# Patient Record
Sex: MALE | Race: WHITE | Employment: FULL TIME | URBAN - METROPOLITAN AREA
[De-identification: names, ages, dates, MRNs, and addresses within clinical notes are randomized per-mention and may not be internally consistent; named-entity substitution may affect disease eponyms.]

---

## 2018-11-02 ENCOUNTER — APPOINTMENT (OUTPATIENT)
Dept: PHYSICAL THERAPY | Age: 53
End: 2018-11-02

## 2018-11-12 ENCOUNTER — HOSPITAL ENCOUNTER (OUTPATIENT)
Dept: PHYSICAL THERAPY | Age: 53
Discharge: HOME | End: 2018-11-12
Payer: OTHER GOVERNMENT

## 2018-11-12 PROCEDURE — 97162 PT EVAL MOD COMPLEX 30 MIN: CPT

## 2018-11-12 PROCEDURE — 97110 THERAPEUTIC EXERCISES: CPT

## 2018-11-12 PROCEDURE — 97140 MANUAL THERAPY 1/> REGIONS: CPT

## 2018-11-12 NOTE — PROGRESS NOTES
[address] [facility] PHYSICAL THERAPY 
   [address], [address] [place] [address] Phone: [phone]  Fax: [phone] PLAN OF CARE / STATEMENT OF MEDICAL NECESSITY FOR PHYSICAL THERAPY SERVICES Patient Name: Bernard Bell : 1965 Medical  
Diagnosis: B Hip Pain Treatment Diagnosis: Right hip pain [M25.551] Onset Date: Chronic Referral Source: Ramon Samayoa MD Start of Care [facility]): 2018 Prior Hospitalization: See medical history Provider #: [id number] Prior Level of Function: Chronic history of difficulty with prolonged sitting and exercise activities. Comorbidities: HTN, Abdominal hernia repair with mesh  Medications: Verified on Patient Summary List  
The Plan of Care and following information is based on the information from the initial evaluation.  
=========================================================================================== Assessment / key information:  Patient is a 48year old male presenting to therapy with signs and symptoms consistent with B hip pain. Patient states he has been experiencing chronic R hip flexor tightness and pain since 2017. Patient received PT services for about 2 months which helped somewhat, however toward the end of his treatment began experiencing L hip (piriformis) pain. Patient states he stretches on a daily basis which helps somewhat as long as he doesn't over do it. Patient did receive an x-ray which was positive for BRANDON on the R side. Patient reports increased difficulty with prolonged sitting and exercising. Patient notes symptoms feel better with stretching and rest. Patient rates pain at worst 8/10 and 0/10 at best. 
 Objective Data: Inspection-Poor seated posture, forward head, rounded shoulders. Lumbar Spine AROM: flex hands to mid shins, ext 75%. B hip flexion AROM WNL, min restriction to B IR.  Strength Testing: Hip flex R 4/5, L 5/5; ext R 4/5, L 4/5; abd R 4/5, L 4/5; Knee Flex R 5/5, L 5/5; ext R 5/5, L 5/5. Flexibility Testing: moderate restriction to B quadriceps and HS. Tenderness to R IP, R RF, R VL, L PF, L glute max and glute med. FOTO: 71/100. Patient educated on diagnosis, prognosis, POC and HEP. Patient issued copy of HEP and denied additional questions. Patient will benefit from skilled PT in order to address these impairments and functional limitations. =========================================================================================== Eval Complexity: History HIGH Complexity :3+ comorbidities / personal factors will impact the outcome/ POC ;  Examination  HIGH Complexity : 4+ Standardized tests and measures addressing body structure, function, activity limitation and / or participation in recreation ; Presentation MEDIUM Complexity : Evolving with changing characteristics ; Decision Making MEDIUM Complexity : FOTO score of 26-74; Overall Complexity MEDIUM Problem List: pain affecting function, decrease ROM, decrease strength, impaired gait/ balance, decrease ADL/ functional abilitiies, decrease activity tolerance and decrease flexibility/ joint mobility Treatment Plan may include any combination of the following: Therapeutic exercise, Therapeutic activities, Neuromuscular re-education, Physical agent/modality, Gait/balance training, Manual therapy, Patient education and Functional mobility training Patient / Family readiness to learn indicated by: asking questions, trying to perform skills and interestPersons(s) to be included in education: patient (P) Barriers to Learning/Limitations: no 
Measures taken:   
Patient Goal (s): Reduce pain, resume fitness routine Patient self reported health status: good Rehabilitation Potential: good ? Short Term Goals: To be accomplished in  3  weeks: 1. Patient will demonstrate independence with HEP for self management of symptoms. 2. Patient will report reduction in pain at worst to 4-5/10 in order to improve tolerance to work activities. ? Long Term Goals: To be accomplished in  6  weeks: 1. Patient will improve FOTO to >/= 78/100 in order to improve quality of life. 2. Patient will report reduction in pain at worst to 1-2/10 in order to improve tolerance to recreational activities. 3. Patient will report a +5 on the GROC in order to prepare for DC to HEP. Frequency / Duration:   Patient to be seen  2  times per week for 6  weeks: 
Patient / Caregiver education and instruction: self care, activity modification and exercises G-Codes (GP): cecille Therapist Signature: Miguel Sandhu PT Date: 11/12/2018 Certification Period: na Time: 8:55 AM  
=========================================================================================== I certify that the above Physical Therapy Services are being furnished while the patient is under my care. I agree with the treatment plan and certify that this therapy is necessary. Physician Signature:       Date:      Time:  Please sign and return to [facility] or you may fax the signed copy to [phone]. Thank you.

## 2018-11-12 NOTE — PROGRESS NOTES
PHYSICAL THERAPY - DAILY TREATMENT NOTE Patient Name: Adelaide Travis        Date: 2018 : 1965   YES Patient  Verified Visit #:     Insurance: Payor: DALIA / Plan: Itzel Avitia / Product Type: Willie Imus / In time: 800 Out time: 845 Total Treatment Time: 45 Medicare Time Tracking (below) Total Timed Codes (min):  na 1:1 Treatment Time:  na  
TREATMENT AREA =  Right hip pain [M25.551] SUBJECTIVE Pain Level (on 0 to 10 scale):  3  / 10 Medication Changes/New allergies or changes in medical history, any new surgeries or procedures? NO    If yes, update Summary List  
Subjective Functional Status/Changes:  []  No changes reported See POC OBJECTIVE 10 min Therapeutic Exercise:  [x]  See flow sheet Rationale:      increase strength and increase proprioception to improve the patients ability to perform walking activities. 10 min Manual Therapy: A. R.T to R IP and L piriformis Rationale:      decrease pain, increase ROM, increase tissue extensibility and decrease trigger points to improve patient's ability to perform standing activities. min Patient Education:  YES  Reviewed HEP []  Progressed/Changed HEP based on: Other Objective/Functional Measures: 
 
See POC Post Treatment Pain Level (on 0 to 10) scale:   3  / 10 ASSESSMENT Assessment/Changes in Function:  
 
See POC []  See Progress Note/Recertification Patient will continue to benefit from skilled PT services to modify and progress therapeutic interventions, address functional mobility deficits, address ROM deficits, address strength deficits, analyze and address soft tissue restrictions, analyze and cue movement patterns, analyze and modify body mechanics/ergonomics and assess and modify postural abnormalities to attain remaining goals. Progress toward goals / Updated goals: 
See POC PLAN [x]  Upgrade activities as tolerated YES Continue plan of care []  Discharge due to :   
[]  Other:   
 
Therapist: García Quarles PT Date: 11/12/2018 Time: 9:08 AM  
 
Future Appointments Date Time Provider [place] 11/15/2018  2:00 PM Jane Bustamante, PT [facility]  
11/20/2018  3:30 PM Jane Bustamante, PT [facility]  
11/27/2018  3:00 PM Jane Bustamante, PT [facility]  
11/30/2018  8:00 AM Jane Bustamante, PT [facility]  
12/3/2018  7:30 AM Jane Bustamante, PT [facility]  
12/7/2018  7:30 AM Jane Bustamante, PT [facility]  
12/10/2018  7:30 AM Jane Bustamante, PT [facility]  
12/14/2018  7:30 AM Juany Light, PT [facility]

## 2018-11-15 ENCOUNTER — HOSPITAL ENCOUNTER (OUTPATIENT)
Dept: PHYSICAL THERAPY | Age: 53
Discharge: HOME | End: 2018-11-15
Payer: OTHER GOVERNMENT

## 2018-11-15 PROCEDURE — 97110 THERAPEUTIC EXERCISES: CPT

## 2018-11-15 PROCEDURE — 97140 MANUAL THERAPY 1/> REGIONS: CPT

## 2018-11-15 NOTE — PROGRESS NOTES
PHYSICAL THERAPY - DAILY TREATMENT NOTE Patient Name: Christy Mealing        Date: 11/15/2018 : 1965   YES Patient  Verified Visit #:      of   12  Insurance: Payor: DALIA / Plan: [address] / Product Type: [address] [place] [address] / In time: 206 Out time: 303 Total Treatment Time: 62 Medicare Time Tracking (below) Total Timed Codes (min):  na 1:1 Treatment Time:  na  
TREATMENT AREA =  Right hip pain [M25.551] SUBJECTIVE Pain Level (on 0 to 10 scale):  2  / 10 Medication Changes/New allergies or changes in medical history, any new surgeries or procedures? NO    If yes, update Summary List  
Subjective Functional Status/Changes:  []  No changes reported Patient reports compliance with his HEP and he feels he is doing a little bit better. OBJECTIVE Modalities Rationale:     decrease inflammation and decrease pain to improve patient's ability to perform transfers. min [] Estim, type/location:   
                                 []  att     []  unatt     []  w/US     []  w/ice    []  w/heat 
 min []  Mechanical Traction: type/lbs   
               []  pro   []  sup   []  int   []  cont    []  before manual    []  after manual  
 min []  Ultrasound, settings/location:    
 min []  Iontophoresis w/ dexamethasone, location:   
                                           []  take home patch       []  in clinic  
10 min [x]  Ice     []  Heat    location/position: To R hip flexor and L piriformis in supine with bolster  
 min []  Vasopneumatic Device, press/temp:   
 min []  Other:   
[x] Skin assessment post-treatment (if applicable):   
[x]  intact    []  redness- no adverse reaction    
[]redness  adverse reaction:     
27 min Therapeutic Exercise:  [x]  See flow sheet Rationale:      increase ROM and increase strength to improve the patients ability to perform walking activities. 20 min Manual Therapy: A. R.T to R IP, L piriformis Rationale:      decrease pain, increase ROM, increase tissue extensibility and decrease trigger points to improve patient's ability to perform recreational activities. min Patient Education:  YES  Reviewed HEP []  Progressed/Changed HEP based on: Other Objective/Functional Measures: 
 
Progressed therapy program per flowsheet in order to improve core and hip strength/stability Tenderness noted to R IP and L piriformis during MT Patient demonstrating good technique with HEP indicating good carry over from IE Post Treatment Pain Level (on 0 to 10) scale:   2  / 10 ASSESSMENT Assessment/Changes in Function:  
 
Patient denied changes in symptoms post session. Educated on likely increase in soreness over the next 24-48 hours, patient acknowledged understanding. []  See Progress Note/Recertification Patient will continue to benefit from skilled PT services to modify and progress therapeutic interventions, address functional mobility deficits, address ROM deficits, address strength deficits, analyze and address soft tissue restrictions, analyze and cue movement patterns, analyze and modify body mechanics/ergonomics and assess and modify postural abnormalities to attain remaining goals. Progress toward goals / Updated goals: 
Progressing with STG#1 PLAN [x]  Upgrade activities as tolerated YES Continue plan of care  
[]  Discharge due to :   
[]  Other:   
 
Therapist: Vangie Connell, PT Date: 11/15/2018 Time: 2:58 PM  
 
Future Appointments Date Time Provider [place] 11/20/2018  3:30 PM Fabrizio Zamarripa PT [facility]  
11/27/2018  3:00 PM Fabrizio Zamarripa PT [facility]  
11/30/2018  8:00 AM Fabrizio Zamarripa PT [facility]  
12/3/2018  7:30 AM Fabrizio Zamarripa PT [facility]  
12/7/2018  7:30 AM Fabrizio Zamarripa PT [facility]  
12/10/2018  7:30 AM Fabrizio Zamarripa PT [facility]  
12/14/2018  7:30 AM Caroline Adam, PT [facility]

## 2018-11-20 ENCOUNTER — HOSPITAL ENCOUNTER (OUTPATIENT)
Dept: PHYSICAL THERAPY | Age: 53
Discharge: HOME | End: 2018-11-20
Payer: OTHER GOVERNMENT

## 2018-11-20 PROCEDURE — 97110 THERAPEUTIC EXERCISES: CPT

## 2018-11-20 PROCEDURE — 97140 MANUAL THERAPY 1/> REGIONS: CPT

## 2018-11-27 ENCOUNTER — HOSPITAL ENCOUNTER (OUTPATIENT)
Dept: PHYSICAL THERAPY | Age: 53
Discharge: HOME | End: 2018-11-27
Payer: OTHER GOVERNMENT

## 2018-11-27 PROCEDURE — 97140 MANUAL THERAPY 1/> REGIONS: CPT

## 2018-11-27 PROCEDURE — 97110 THERAPEUTIC EXERCISES: CPT

## 2018-11-27 NOTE — PROGRESS NOTES
PHYSICAL THERAPY - DAILY TREATMENT NOTE Patient Name: Rupinder Vásquez        Date: 2018 : 1965   YES Patient  Verified Visit #:   4   of   12  Insurance: Payor: DALIA / Plan: Iram Lopes / Product Type: Dmitriyerin Jacey / In time: 258 Out time: 358 Total Treatment Time: 60 Medicare Time Tracking (below) Total Timed Codes (min):  na 1:1 Treatment Time:  na  
TREATMENT AREA =  Right hip pain [M25.551] SUBJECTIVE Pain Level (on 0 to 10 scale):  2  / 10 Medication Changes/New allergies or changes in medical history, any new surgeries or procedures? NO    If yes, update Summary List  
Subjective Functional Status/Changes:  []  No changes reported Patient reports his hips feel \"about the same\" over the past several days. Patient states he has been doing his exercises about 1 time per day and that his stretching still seems to help. OBJECTIVE Modalities Rationale:     decrease inflammation and decrease pain to improve patient's ability to perform transfers. min [] Estim, type/location:   
                                 []  att     []  unatt     []  w/US     []  w/ice    []  w/heat 
 min []  Mechanical Traction: type/lbs   
               []  pro   []  sup   []  int   []  cont    []  before manual    []  after manual  
 min []  Ultrasound, settings/location:    
 min []  Iontophoresis w/ dexamethasone, location:   
                                           []  take home patch       []  in clinic  
10 min [x]  Ice     []  Heat    location/position: To R hip in supine with bolster  
 min []  Vasopneumatic Device, press/temp:   
 min []  Other:   
[x] Skin assessment post-treatment (if applicable):   
[x]  intact    []  redness- no adverse reaction    
[]redness  adverse reaction:     
35 min Therapeutic Exercise:  [x]  See flow sheet Rationale:      increase ROM, increase strength, improve coordination and improve balance to improve the patients ability to perform recreational activities. 15 min Manual Therapy: A. R.T to R IP, R RF, L PF; STN to R adductors; R hip flexion and adduction stretch Rationale:      decrease pain, increase ROM, increase tissue extensibility and decrease trigger points to improve patient's ability to perform walking activities. min Patient Education:  YES  Reviewed HEP []  Progressed/Changed HEP based on: Other Objective/Functional Measures: 
 
Patient presenting with tenderness to R adductors and L PF during MT Added mini band hip IR/ER and monster walks in conjunction with side steps this session in order to continue to improve hip strength and stability Cued patient on pallof press on improving hold time in order to emphasize core stability Post Treatment Pain Level (on 0 to 10) scale:   1  / 10 ASSESSMENT Assessment/Changes in Function:  
 
Patient noted slight reduction in symptoms post session. Patient symptoms will likely begin to improve as he continues to perform consistent HEP and therapy program over the next several weeks. []  See Progress Note/Recertification Patient will continue to benefit from skilled PT services to modify and progress therapeutic interventions, address functional mobility deficits, address ROM deficits, address strength deficits, analyze and address soft tissue restrictions, analyze and cue movement patterns, analyze and modify body mechanics/ergonomics and assess and modify postural abnormalities to attain remaining goals. Progress toward goals / Updated goals: 
Progressing with LTG#2 PLAN [x]  Upgrade activities as tolerated YES Continue plan of care  
[]  Discharge due to :   
[]  Other:   
 
Therapist: García Quarles, PT Date: 11/27/2018 Time: 4:22 PM  
 
Future Appointments Date Time Provider [place] 11/30/2018  8:00 AM Juany Light, LUCY [facility] 12/3/2018  7:30 AM Rigoberto Cox, PT [facility]  
12/7/2018  7:30 AM Rigoberto Cox, PT [facility]  
12/10/2018  7:30 AM Rigoberto Cox, PT [facility]  
12/14/2018  7:30 AM Breana Ruiz, PT [facility]

## 2018-11-30 ENCOUNTER — HOSPITAL ENCOUNTER (OUTPATIENT)
Dept: PHYSICAL THERAPY | Age: 53
Discharge: HOME | End: 2018-11-30
Payer: OTHER GOVERNMENT

## 2018-11-30 PROCEDURE — 97110 THERAPEUTIC EXERCISES: CPT

## 2018-11-30 PROCEDURE — 97140 MANUAL THERAPY 1/> REGIONS: CPT

## 2018-11-30 NOTE — PROGRESS NOTES
PHYSICAL THERAPY - DAILY TREATMENT NOTE Patient Name: Juana Chakraborty        Date: 2018 : 1965   YES Patient  Verified Visit #:   5   of   12  Insurance: Payor:  / Plan: Cyndee Pastures / Product Type: Bud Suzanne / In time: 756 Out time: 123 Total Treatment Time: 76 Medicare Time Tracking (below) Total Timed Codes (min):  na 1:1 Treatment Time:  na  
TREATMENT AREA =  Right hip pain [M25.551] SUBJECTIVE Pain Level (on 0 to 10 scale):  1  / 10 Medication Changes/New allergies or changes in medical history, any new surgeries or procedures? NO    If yes, update Summary List  
Subjective Functional Status/Changes:  []  No changes reported Patient reports he feels like his hips are slowly improving. Patient states he is usually sore after his appointments but is able to [place] it out. \" OBJECTIVE Modalities Rationale:     decrease inflammation and decrease pain to improve patient's ability to perform transfers. min [] Estim, type/location:   
                                 []  att     []  unatt     []  w/US     []  w/ice    []  w/heat 
 min []  Mechanical Traction: type/lbs   
               []  pro   []  sup   []  int   []  cont    []  before manual    []  after manual  
 min []  Ultrasound, settings/location:    
 min []  Iontophoresis w/ dexamethasone, location:   
                                           []  take home patch       []  in clinic  
10 min [x]  Ice     []  Heat    location/position:   
 min []  Vasopneumatic Device, press/temp:   
 min []  Other:   
[] Skin assessment post-treatment (if applicable):   
[]  intact    []  redness- no adverse reaction    
[]redness  adverse reaction:     
43 min Therapeutic Exercise:  [x]  See flow sheet Rationale:      increase ROM, increase strength, improve coordination and increase proprioception to improve the patients ability to perform walking activities. 15 min Manual Therapy: STM to R RF, L glute med, L piriformis Rationale:      decrease pain, increase ROM, increase tissue extensibility and decrease trigger points to improve patient's ability to perform standing  
 min Patient Education:  YES  Reviewed HEP []  Progressed/Changed HEP based on: Other Objective/Functional Measures: Added SLR into adduction, abduction and ext this session and educated on performance at home Instructed on bottoms up HS stretch this session in order to improve posterior chain mobility Post Treatment Pain Level (on 0 to 10) scale:   0  / 10 ASSESSMENT Assessment/Changes in Function:  
 
Patient tolerated treatment session well, noted reduced pain post session. Patient educated on new exercises to perform at home, acknowledged understanding. []  See Progress Note/Recertification Patient will continue to benefit from skilled PT services to modify and progress therapeutic interventions, address functional mobility deficits, address ROM deficits, address strength deficits, analyze and address soft tissue restrictions, analyze and cue movement patterns, analyze and modify body mechanics/ergonomics and assess and modify postural abnormalities to attain remaining goals. Progress toward goals / Updated goals: 
Progressing with LTG#2 PLAN [x]  Upgrade activities as tolerated YES Continue plan of care  
[]  Discharge due to :   
[]  Other:   
 
Therapist: Sha Lynn PT Date: 11/30/2018 Time: 10:10 AM  
 
Future Appointments Date Time Provider [place] 12/3/2018  7:30 AM Juan C Brenner PT [address]  
12/7/2018  7:30 AM Juan C Brenner, PT [address]  
12/10/2018  7:30 AM Juan C Brenner PT [address]  
12/14/2018  7:30 AM Merwyn Jenny Wendelin Leventhal, PT [address]

## 2018-12-03 ENCOUNTER — HOSPITAL ENCOUNTER (OUTPATIENT)
Dept: PHYSICAL THERAPY | Age: 53
Discharge: HOME | End: 2018-12-03
Payer: OTHER GOVERNMENT

## 2018-12-03 PROCEDURE — 97761 PROSTHETIC TRAING 1ST ENC: CPT

## 2018-12-03 PROCEDURE — 97760 ORTHOTIC MGMT&TRAING 1ST ENC: CPT

## 2018-12-03 PROCEDURE — 97763 ORTHC/PROSTC MGMT SBSQ ENC: CPT

## 2018-12-03 PROCEDURE — 97140 MANUAL THERAPY 1/> REGIONS: CPT

## 2018-12-03 PROCEDURE — 97110 THERAPEUTIC EXERCISES: CPT

## 2018-12-03 NOTE — PROGRESS NOTES
PHYSICAL THERAPY - DAILY TREATMENT NOTE Patient Name: Sumi Burger        Date: 12/3/2018 : 1965   YES Patient  Verified Visit #:      12  Insurance: Payor: DALIA / Plan: Aracelis Burris / Product Type: Anderson Harpreet / In time: 725 Out time: 595 Total Treatment Time: 72 Medicare Time Tracking (below) Total Timed Codes (min):  na 1:1 Treatment Time:  na  
TREATMENT AREA =  Right hip pain [M25.551] SUBJECTIVE Pain Level (on 0 to 10 scale):  1  / 10 Medication Changes/New allergies or changes in medical history, any new surgeries or procedures? NO    If yes, update Summary List  
Subjective Functional Status/Changes:  []  No changes reported Patient reports he was feeling good over the weekend and feels his hips have been doing better overall. OBJECTIVE Modalities Rationale:     decrease inflammation and decrease pain to improve patient's ability to perform work activities. min [] Estim, type/location:   
                                 []  att     []  unatt     []  w/US     []  w/ice    []  w/heat 
 min []  Mechanical Traction: type/lbs   
               []  pro   []  sup   []  int   []  cont    []  before manual    []  after manual  
 min []  Ultrasound, settings/location:    
 min []  Iontophoresis w/ dexamethasone, location:   
                                           []  take home patch       []  in clinic  
10 min [x]  Ice     []  Heat    location/position: To B hips in supine with bolster  
 min []  Vasopneumatic Device, press/temp:   
 min []  Other:   
[x] Skin assessment post-treatment (if applicable):   
[x]  intact    []  redness- no adverse reaction    
[]redness  adverse reaction:     40 min Therapeutic Exercise:  [x]  See flow sheet Rationale:      increase ROM and increase strength to improve the patients ability to perform recreational activities. 15 min Manual Therapy: STM to R RF, R IP, R adductors, L PF , L glute med Rationale:      decrease pain, increase ROM, increase tissue extensibility and decrease trigger points to improve patient's ability to perform household activities. min Patient Education:  YES  Reviewed HEP []  Progressed/Changed HEP based on: Other Objective/Functional Measures: Added goblet squats with 15# weight this session in order to improve core and LE strength Continued tenderness to R adductors during MT 
FOTO 76/100 Post Treatment Pain Level (on 0 to 10) scale:   0  / 10 ASSESSMENT Assessment/Changes in Function:  
 
Patient tolerated treatment session well, noted reduced pain post session. Patient progressing well with PT program at this time. []  See Progress Note/Recertification Patient will continue to benefit from skilled PT services to modify and progress therapeutic interventions, address functional mobility deficits, address ROM deficits, address strength deficits, analyze and address soft tissue restrictions, analyze and cue movement patterns, analyze and modify body mechanics/ergonomics and assess and modify postural abnormalities to attain remaining goals. Progress toward goals / Updated goals: 
Progressing well toward LTG#1 PLAN [x]  Upgrade activities as tolerated YES Continue plan of care  
[]  Discharge due to :   
[]  Other:   
 
Therapist: Delia Gonzalez, PT Date: 12/3/2018 Time: 9:38 AM  
 
Future Appointments Date Time Provider [place] 12/7/2018  7:30 AM Kasi Storey, PT [facility]  
12/10/2018  7:30 AM Kasi Storey, PT [facility]  
12/18/2018  3:30 PM Kasi Storey, PT [facility]

## 2018-12-07 ENCOUNTER — HOSPITAL ENCOUNTER (OUTPATIENT)
Dept: PHYSICAL THERAPY | Age: 53
Discharge: HOME | End: 2018-12-07
Payer: OTHER GOVERNMENT

## 2018-12-07 PROCEDURE — 97140 MANUAL THERAPY 1/> REGIONS: CPT

## 2018-12-07 PROCEDURE — 97110 THERAPEUTIC EXERCISES: CPT

## 2018-12-07 NOTE — PROGRESS NOTES
[address]  [facility] THERAPY 
[address] [place], [place] [address],[facility], [address] [facility] - Phone: [phone]  Fax: [phone] PROGRESS NOTE Patient Name: Ricardo Fam : 1965 Treatment/Medical Diagnosis: Right hip pain [M25.551] Referral Source: Queen Merissa MD    
Date of Initial Visit: 18 Attended Visits: 7 Missed Visits: 0  
SUMMARY OF TREATMENT Patient has attended 6 follow up visits since his initial evaluation on 18. Patient has received therapeutic exercise, manual therapy and modalities in order to improve B hip ROM, mobility, flexibility, strength, stability, soft tissue extensibility and pain reduction. CURRENT STATUS Patient reports a 50% improvement in symptoms since the start of therapy. Patient states he feels like his strength is improving and has noticed reduced symptoms overall since starting his exercise program. Patient has remained compliant with his home exercises and feels this combined with stretching has helped keep his symptoms reduced. Patient notes his pain at worst is a 3/10 and on average 1/10. Patient notes an improved FOTO score to 76/100 and a +4/100 indicating he is moderately better. Goal/Measure of Progress Goal Met? 1. Patient will improve FOTO to >/= 78/100 in order to improve quality of life Status at last Eval: 71/100 Current Status: 76/100 progressing 2. Patient will report reduction in pain at worst to 1-2/10 in order to improve tolerance to recreational activities Status at last Eval: 8/10 Current Status: 3/10 progressing 3. Patient will report a +5 on the [facility] in order to prepare for DC to HEP Status at last Eval: n/a Current Status: +4 progressing New Goals to be achieved in __3__  weeks: 1. Continue with LTG#1 2. Continue with LTG#2 3. Continue with LTG#3 RECOMMENDATIONS Plan to continue with PT 1x/week for 3 weeks with emphasis on promoting independence with HEP. Plan to DC to HEP at the end of this time. If you have any questions/comments please contact us directly at [phone]. Thank you for allowing us to assist in the care of your patient. Therapist Signature: Dexter Ramirez PT Date: 12/7/2018 Time: 7:57 AM  
NOTE TO PHYSICIAN:  PLEASE COMPLETE THE ORDERS BELOW AND FAX TO [facility] Physical Therapy: [phone] If you are unable to process this request in 24 hours please contact our office: [phone] 
 
___ I have read the above report and request that my patient continue as recommended.  
___ I have read the above report and request that my patient continue therapy with the following changes/special instructions:_________________________________________________________  
___ I have read the above report and request that my patient be discharged from therapy.   
 
Physician Signature:       Date:      Time:

## 2018-12-07 NOTE — PROGRESS NOTES
PHYSICAL THERAPY - DAILY TREATMENT NOTE Patient Name: Ignacio Caicedo        Date: 2018 : 1965   YES Patient  Verified Visit #:     Insurance: Payor:  / Plan: Radha Cornea / Product Type: Jose Pond / In time: 720 Out time: 820 Total Treatment Time: 60 Medicare Time Tracking (below) Total Timed Codes (min):  na 1:1 Treatment Time:  na  
TREATMENT AREA =  Right hip pain [M25.551] SUBJECTIVE Pain Level (on 0 to 10 scale):  2  / 10 Medication Changes/New allergies or changes in medical history, any new surgeries or procedures? NO    If yes, update Summary List  
Subjective Functional Status/Changes:  []  No changes reported Patient reports a 50% improvement in symptoms since the start of therapy. Patient states his core exercise program seems to be helping his strength and overall notices reduced pain levels. OBJECTIVE Modalities Rationale:     decrease inflammation and decrease pain to improve patient's ability to perform work activities. min [] Estim, type/location:   
                                 []  att     []  unatt     []  w/US     []  w/ice    []  w/heat 
 min []  Mechanical Traction: type/lbs   
               []  pro   []  sup   []  int   []  cont    []  before manual    []  after manual  
 min []  Ultrasound, settings/location:    
 min []  Iontophoresis w/ dexamethasone, location:   
                                           []  take home patch       []  in clinic  
10 min [x]  Ice     []  Heat    location/position: To B hips in supine with bolster  
 min []  Vasopneumatic Device, press/temp:   
 min []  Other:   
[x] Skin assessment post-treatment (if applicable):   
[x]  intact    []  redness- no adverse reaction    
[]redness  adverse reaction:     
35 min Therapeutic Exercise:  [x]  See flow sheet Rationale:      increase ROM and increase strength to improve the patients ability to perform walking activities. 15 min Manual Therapy: STM to R RF, R adductors, L glute med, L piriformis, L TFL Rationale:      decrease pain, increase ROM, increase tissue extensibility and decrease trigger points to improve patient's ability to perform standing activities. min Patient Education:  YES  Reviewed HEP []  Progressed/Changed HEP based on: Other Objective/Functional Measures: 
 
See PN Post Treatment Pain Level (on 0 to 10) scale:   0  / 10 ASSESSMENT Assessment/Changes in Function:  
 
See PN 
  
[]  See Progress Note/Recertification Patient will continue to benefit from skilled PT services to modify and progress therapeutic interventions, address functional mobility deficits, address ROM deficits, address strength deficits, analyze and address soft tissue restrictions, analyze and cue movement patterns, analyze and modify body mechanics/ergonomics, assess and modify postural abnormalities and address imbalance/dizziness to attain remaining goals. Progress toward goals / Updated goals: 
See PN  
 
PLAN [x]  Upgrade activities as tolerated YES Continue plan of care  
[]  Discharge due to :   
[]  Other:   
 
Therapist: Faina Paz PT Date: 12/7/2018 Time: 9:35 AM  
 
Future Appointments Date Time Provider [place] 12/14/2018  7:00 AM Jesus Lucas, PT [facility]  
12/18/2018  3:30 PM Jesus Lucas PT [facility]

## 2018-12-10 ENCOUNTER — APPOINTMENT (OUTPATIENT)
Dept: PHYSICAL THERAPY | Age: 53
End: 2018-12-10
Payer: OTHER GOVERNMENT

## 2018-12-14 ENCOUNTER — HOSPITAL ENCOUNTER (OUTPATIENT)
Dept: PHYSICAL THERAPY | Age: 53
Discharge: HOME | End: 2018-12-14
Payer: OTHER GOVERNMENT

## 2018-12-14 ENCOUNTER — APPOINTMENT (OUTPATIENT)
Dept: PHYSICAL THERAPY | Age: 53
End: 2018-12-14
Payer: OTHER GOVERNMENT

## 2018-12-14 PROCEDURE — 97110 THERAPEUTIC EXERCISES: CPT

## 2018-12-14 PROCEDURE — 97140 MANUAL THERAPY 1/> REGIONS: CPT

## 2018-12-14 NOTE — PROGRESS NOTES
PHYSICAL THERAPY - DAILY TREATMENT NOTE    Patient Name: Cindi Hargrove        Date: 2018  : 1965   YES Patient  Verified  Visit #:      12  Insurance: Payor: DALIA / Plan: Joanne Holly / Product Type:  /      In time: 658 Out time: 754   Total Treatment Time: 56     Medicare Time Tracking (below)   Total Timed Codes (min):  na 1:1 Treatment Time:  na     TREATMENT AREA =  Right hip pain [M25.551]    SUBJECTIVE  Pain Level (on 0 to 10 scale):  .5  / 10   Medication Changes/New allergies or changes in medical history, any new surgeries or procedures? NO    If yes, update Summary List   Subjective Functional Status/Changes:  []  No changes reported     Patient reports both hips have been feeling good since his LV. Patient continues to perform his exercises which seems to be helping improve his strength and mobility. OBJECTIVE  Modalities Rationale:     decrease pain and increase tissue extensibility to improve patient's ability to perform work activities.     min [] Estim, type/location:                                      []  att     []  unatt     []  w/US     []  w/ice    []  w/heat    min []  Mechanical Traction: type/lbs                   []  pro   []  sup   []  int   []  cont    []  before manual    []  after manual    min []  Ultrasound, settings/location:      min []  Iontophoresis w/ dexamethasone, location:                                               []  take home patch       []  in clinic   10 min []  Ice     [x]  Heat    location/position: To R adductors and L hip in supine with bolster    min []  Vasopneumatic Device, press/temp:     min []  Other:    [x] Skin assessment post-treatment (if applicable):    [x]  intact    []  redness- no adverse reaction     []redness  adverse reaction:        30 min Therapeutic Exercise:  [x]  See flow sheet   Rationale:      increase ROM and increase strength to improve the patients ability to perform walking activities. 14 min Manual Therapy: STM to R adductors, R IP; STM to L piriformis, L glute med, L TFL   Rationale:      decrease pain, increase ROM, increase tissue extensibility and decrease trigger points to improve patient's ability to perform standing activities. min Patient Education:  YES  Reviewed HEP   []  Progressed/Changed HEP based on: Other Objective/Functional Measures:    Continued with modified exercise program this session in order to avoid exacerbating symptoms  Continued tenderness to R adductors and L lateral hip musculature, however improving from previous sessions     Post Treatment Pain Level (on 0 to 10) scale:   .5  / 10     ASSESSMENT  Assessment/Changes in Function:     Patient denied changes in symptoms post session. Patient will be off from PT for 1 week in order to assess tolerance to independent performance of exercise program. If no symptoms arise, plan to DC to HEP at [place].      []  See Progress Note/Recertification   Patient will continue to benefit from skilled PT services to modify and progress therapeutic interventions, address functional mobility deficits, address ROM deficits, address strength deficits, analyze and address soft tissue restrictions, analyze and cue movement patterns, analyze and modify body mechanics/ergonomics, assess and modify postural abnormalities and address imbalance/dizziness to attain remaining goals.    Progress toward goals / Updated goals:    Progressing with reduced pain levels and independence with program      PLAN  [x]  Upgrade activities as tolerated YES Continue plan of care   []  Discharge due to :    []  Other:      Therapist: Vilma Baron, PT    Date: 12/14/2018 Time: 8:03 AM     Future Appointments   Date Time Provider [place]   12/21/2018 10:30 AM Soila Blake, PT [facility]

## 2018-12-18 ENCOUNTER — APPOINTMENT (OUTPATIENT)
Dept: PHYSICAL THERAPY | Age: 53
End: 2018-12-18
Payer: OTHER GOVERNMENT

## 2018-12-21 ENCOUNTER — HOSPITAL ENCOUNTER (OUTPATIENT)
Dept: PHYSICAL THERAPY | Age: 53
Discharge: HOME | End: 2018-12-21
Payer: OTHER GOVERNMENT

## 2018-12-21 PROCEDURE — 97140 MANUAL THERAPY 1/> REGIONS: CPT

## 2018-12-21 PROCEDURE — 97110 THERAPEUTIC EXERCISES: CPT

## 2018-12-21 NOTE — PROGRESS NOTES
PHYSICAL THERAPY - DAILY TREATMENT NOTE    Patient Name: Johnson Genao        Date: 2018  : 1965   YES Patient  Verified  Visit #:     Insurance: Payor: DALIA / Plan: Josselin Vallecillo / Product Type:  /      In time: 398 Out time: 1450   Total Treatment Time: 54     Medicare Time Tracking (below)   Total Timed Codes (min):  na 1:1 Treatment Time:  na     TREATMENT AREA =  Right hip pain [M25.551]    SUBJECTIVE  Pain Level (on 0 to 10 scale):  1  / 10   Medication Changes/New allergies or changes in medical history, any new surgeries or procedures? NO    If yes, update Summary List   Subjective Functional Status/Changes:  []  No changes reported     Patient reports he has been foam rolling his R groin and the back of his L hip and that seems to help keep his pain levels down. Patient states he would like to start to incorporate squats back into his program.          OBJECTIVE  Modalities Rationale:     decrease pain and increase tissue extensibility to improve patient's ability to perform work activities.     min [] Estim, type/location:                                      []  att     []  unatt     []  w/US     []  w/ice    []  w/heat    min []  Mechanical Traction: type/lbs                   []  pro   []  sup   []  int   []  cont    []  before manual    []  after manual    min []  Ultrasound, settings/location:      min []  Iontophoresis w/ dexamethasone, location:                                               []  take home patch       []  in clinic   10 min []  Ice     [x]  Heat    location/position: To R adductors and L posterior hip in supine with bolster    min []  Vasopneumatic Device, press/temp:     min []  Other:    [] Skin assessment post-treatment (if applicable):    [x]  intact    []  redness- no adverse reaction     []redness  adverse reaction:        30 min Therapeutic Exercise:  [x]  See flow sheet   Rationale:      increase ROM and increase strength to improve the patients ability to perform walking activities. 14 min Manual Therapy: STM to R adductors, R IP, L PF, L TFL; R hip flexion and ER stretch   Rationale:      decrease pain, increase ROM, increase tissue extensibility and decrease trigger points to improve patient's ability to perform recreational activities. min Patient Education:  YES  Reviewed HEP   []  Progressed/Changed HEP based on: Other Objective/Functional Measures: Added goblet squat with 15# weight this session in order to improve core and B hip strength/stability  Minimal tenderness noted to R adductors during MT, an improvement compared to previous visits     Post Treatment Pain Level (on 0 to 10) scale:   0  / 10     ASSESSMENT  Assessment/Changes in Function:     Patient tolerated session well, denied increased pain post session. Patient appropriate to transition to DC to HEP at [place] if no symptom arise over this time. []  See Progress Note/Recertification   Patient will continue to benefit from skilled PT services to modify and progress therapeutic interventions, address functional mobility deficits, address ROM deficits, address strength deficits, analyze and address soft tissue restrictions, analyze and cue movement patterns, analyze and modify body mechanics/ergonomics and assess and modify postural abnormalities to attain remaining goals. Progress toward goals / Updated goals:    Plan to DC to HEP at [place] if no symptoms arise.       PLAN  [x]  Upgrade activities as tolerated YES Continue plan of care   []  Discharge due to :    []  Other:      Therapist: Sky Simmons PT    Date: 12/21/2018 Time: 11:51 AM     Future Appointments   Date Time Provider [place]   1/7/2019  7:00 AM Joyce Mason, LUC[facility]

## 2019-01-04 ENCOUNTER — HOSPITAL ENCOUNTER (OUTPATIENT)
Dept: PHYSICAL THERAPY | Age: 54
Discharge: HOME | End: 2019-01-04
Payer: OTHER GOVERNMENT

## 2019-01-04 PROCEDURE — 97140 MANUAL THERAPY 1/> REGIONS: CPT

## 2019-01-04 PROCEDURE — 97110 THERAPEUTIC EXERCISES: CPT

## 2019-01-04 NOTE — PROGRESS NOTES
PHYSICAL THERAPY - DAILY TREATMENT NOTE Patient Name: Fabrizio Rivera        Date: 2019 : 1965   YES Patient  Verified Visit #:   42   of   12(+4)  Insurance: Payor:  / Plan: Dock Prows / Product Type: Carter Hover / In time: 1000 Out time: 1103 Total Treatment Time: 61 Medicare Time Tracking (below) Total Timed Codes (min):  na 1:1 Treatment Time:  na  
TREATMENT AREA =  Right hip pain [M25.551] SUBJECTIVE Pain Level (on 0 to 10 scale):  2  / 10 Medication Changes/New allergies or changes in medical history, any new surgeries or procedures? NO    If yes, update Summary List  
Subjective Functional Status/Changes:  []  No changes reported Patient reports a 50% improvement in symptoms since the start of PT. Patient states he has remained consistent with his HEP but feels like his symptoms still persist. Patient rates his pain at worst as a 3-4/10. OBJECTIVE Modalities Rationale:     decrease pain and increase tissue extensibility to improve patient's ability to perform transfers. min [] Estim, type/location:   
                                 []  att     []  unatt     []  w/US     []  w/ice    []  w/heat 
 min []  Mechanical Traction: type/lbs   
               []  pro   []  sup   []  int   []  cont    []  before manual    []  after manual  
 min []  Ultrasound, settings/location:    
 min []  Iontophoresis w/ dexamethasone, location:   
                                           []  take home patch       []  in clinic  
10 min []  Ice     [x]  Heat    location/position: To B hips in supine with bolster  
 min []  Vasopneumatic Device, press/temp:   
 min []  Other:   
[x] Skin assessment post-treatment (if applicable):   
[x]  intact    []  redness- no adverse reaction    
[]redness  adverse reaction:     
40 min Therapeutic Exercise:  [x]  See flow sheet Rationale:      increase ROM and increase strength to improve the patients ability to perform recreational activities. 13 min Manual Therapy: STM to R HS, R adductors, L PF, L glute max Rationale:      decrease pain, increase ROM, increase tissue extensibility and decrease trigger points to improve patient's ability to perform standing activities. min Patient Education:  YES  Reviewed HEP []  Progressed/Changed HEP based on: Other Objective/Functional Measures: 
 
See PN Post Treatment Pain Level (on 0 to 10) scale:   0  / 10 ASSESSMENT Assessment/Changes in Function:  
 
See PN 
  
 
PLAN [x]  Upgrade activities as tolerated YES Continue plan of care  
[]  Discharge due to :   
[]  Other:   
 
Therapist: Bennett Tay PT Date: 1/4/2019 Time: 11:54 AM  
 
Future Appointments Date Time Provider [place] 1/8/2019  9:00 AM Davian Dimas, PT [facility]  
1/15/2019  9:00 AM Davian Dimas, PT [facility]  
1/22/2019  9:00 AM Davian Dimas, PT [facility]  
1/28/2019  7:30 AM Davian Dimas, PT [facility]  
2/4/2019  7:30 AM Mango Hill, PT [facility]

## 2019-01-04 NOTE — PROGRESS NOTES
[address]  [facility] THERAPY 
[address] [facility], [address] [facility] - Phone: [phone]  Fax: [phone] PROGRESS NOTE Patient Name: Kim Bañuelos : 1965 Treatment/Medical Diagnosis: Right hip pain [M25.551] Referral Source: Chana Burleson MD    
Date of Initial Visit: 18 Attended Visits: 10 Missed Visits: 0  
SUMMARY OF TREATMENT Patient has attended 9 follow up visits since his initial evaluation on 18. Patient has received therapeutic exercise, manual therapy and modalities in order to improve R hip ROM, mobility, strength, stability, soft tissue extensibility and pain reduction. CURRENT STATUS Patient reports a continued 50% improvement in symptoms since the start of PT. Patient has attended 2 follow up appointment since his last progress note on 18 as an assessment with less PT and increased performance of HEP was taken. Patient states overall he did well over this time, but still notices symptoms into both hip. Patient does demonstrate improved B hip strength as evident by performance of current exercise program. Patient would continue to benefit from PT in order to progress current program and help reduce pain with work and household activities. Goal/Measure of Progress Goal Met? 1. Patient will improve FOTO to >/= 78/100 in order to improve quality of life Status at last Eval: 76/100 Current Status: n/a n/a 2. Patient will report reduction in pain at worst to 1-2/10 in order to improve tolerance to recreational activities Status at last Eval: 310 Current Status: 3-4/10 no 3. Patient will report a +5 on the [facility] in order to prepare for DC to HEP Status at last Eval: +4 Current Status: n/a n/a New Goals to be achieved in __4__  weeks: 1. Continue with LTG#1 2. Continue with LTG#2 3. Continue with LTG#3 RECOMMENDATIONS Patient would benefit from continued PT 1x/week for 4 weeks.  Plan to DC to HEP at the end of this time. If you have any questions/comments please contact us directly at [phone]. Thank you for allowing us to assist in the care of your patient. Therapist Signature: Karen Koenig PT Date: 1/4/2019 Time: 11:02 AM  
NOTE TO PHYSICIAN:  PLEASE COMPLETE THE ORDERS BELOW AND FAX TO [facility] Physical Therapy: [phone] If you are unable to process this request in 24 hours please contact our office: [phone] 
 
___ I have read the above report and request that my patient continue as recommended.  
___ I have read the above report and request that my patient continue therapy with the following changes/special instructions:_________________________________________________________  
___ I have read the above report and request that my patient be discharged from therapy.   
 
Physician Signature:       Date:      Time:

## 2019-01-07 ENCOUNTER — APPOINTMENT (OUTPATIENT)
Dept: PHYSICAL THERAPY | Age: 54
End: 2019-01-07
Payer: OTHER GOVERNMENT

## 2019-01-08 ENCOUNTER — HOSPITAL ENCOUNTER (OUTPATIENT)
Dept: PHYSICAL THERAPY | Age: 54
Discharge: HOME | End: 2019-01-08
Payer: OTHER GOVERNMENT

## 2019-01-08 PROCEDURE — 97110 THERAPEUTIC EXERCISES: CPT

## 2019-01-08 PROCEDURE — 97140 MANUAL THERAPY 1/> REGIONS: CPT

## 2019-01-08 NOTE — PROGRESS NOTES
PHYSICAL THERAPY - DAILY TREATMENT NOTE Patient Name: Rajesh Rodriguez        Date: 2019 : 1965   YES Patient  Verified Visit #:   6   of   16  Insurance: Payor: DALIA / Plan: Mikel Diego / Product Type: [place] Leslie / In time: 904 Out time: 2569 Total Treatment Time: 61 Medicare Time Tracking (below) Total Timed Codes (min):  na 1:1 Treatment Time:  na  
TREATMENT AREA =  Right hip pain [M25.551] SUBJECTIVE Pain Level (on 0 to 10 scale):  2  / 10 Medication Changes/New allergies or changes in medical history, any new surgeries or procedures? NO    If yes, update Summary List  
Subjective Functional Status/Changes:  []  No changes reported Patient reports he felt good after his last appointment and tried his new exercises at home. Patient states he went rollerbladding over the weekend which made him more sore. OBJECTIVE Modalities Rationale:     decrease pain and increase tissue extensibility to improve patient's ability to perform transfers. min [] Estim, type/location:   
                                 []  att     []  unatt     []  w/US     []  w/ice    []  w/heat 
 min []  Mechanical Traction: type/lbs   
               []  pro   []  sup   []  int   []  cont    []  before manual    []  after manual  
 min []  Ultrasound, settings/location:    
 min []  Iontophoresis w/ dexamethasone, location:   
                                           []  take home patch       []  in clinic  
10 min []  Ice     [x]  Heat    location/position: To B hips in supine with bolster  
 min []  Vasopneumatic Device, press/temp:   
 min []  Other:   
[x] Skin assessment post-treatment (if applicable):   
[x]  intact    []  redness- no adverse reaction    
[]redness  adverse reaction:     
40 min Therapeutic Exercise:  [x]  See flow sheet Rationale:      increase ROM and increase strength to improve the patients ability to perform recreational activities. 13 min Manual Therapy: STM to L glute max, L proximal HS, L TFL Rationale:      decrease pain, increase ROM, increase tissue extensibility and decrease trigger points to improve patient's ability to perform walking activities. min Patient Education:  YES  Reviewed HEP []  Progressed/Changed HEP based on: Other Objective/Functional Measures: 
 
Patient demonstrating improved tolerance to hip IR mobilization this session indicating good carry over from his previous session Added YTB to dead bug this session in order to improve anterior core stability. Post Treatment Pain Level (on 0 to 10) scale:   0  / 10 ASSESSMENT Assessment/Changes in Function:  
 
Patient tolerated treatment session well. Patient would benefit from continued progression of program in order to improve tolerance to recreational activities. []  See Progress Note/Recertification Patient will continue to benefit from skilled PT services to modify and progress therapeutic interventions, address functional mobility deficits, address ROM deficits, address strength deficits, analyze and address soft tissue restrictions, analyze and cue movement patterns, analyze and modify body mechanics/ergonomics and assess and modify postural abnormalities to attain remaining goals. Progress toward goals / Updated goals: 
Progressing with long term strength PLAN [x]  Upgrade activities as tolerated YES Continue plan of care  
[]  Discharge due to :   
[]  Other:   
 
Therapist: Ariel Olvera PT Date: 1/8/2019 Time: 11:04 AM  
 
Future Appointments Date Time Provider [place] 1/15/2019  9:00 AM Franklin Galindo PT [facility]  
1/22/2019  9:00 AM Franklin Galindo PT [facility]  
1/28/2019  7:30 AM Franklin Galindo PT [facility]  
2/4/2019  7:30 AM Marcial Glaser PT [facility]

## 2019-01-15 ENCOUNTER — HOSPITAL ENCOUNTER (OUTPATIENT)
Dept: PHYSICAL THERAPY | Age: 54
Discharge: HOME | End: 2019-01-15
Payer: OTHER GOVERNMENT

## 2019-01-15 PROCEDURE — 97140 MANUAL THERAPY 1/> REGIONS: CPT

## 2019-01-15 PROCEDURE — 97110 THERAPEUTIC EXERCISES: CPT

## 2019-01-15 NOTE — PROGRESS NOTES
PHYSICAL THERAPY - DAILY TREATMENT NOTE Patient Name: Vandana Curiel        Date: 1/15/2019 : 1965   YES Patient  Verified Visit #:   15   of   16  Insurance: Payor:  / Plan: Dorothy Barlow / Product Type: Ramakrishna Arianna / In time: 851 Out time: 297 Total Treatment Time: 72 Medicare Time Tracking (below) Total Timed Codes (min):  na 1:1 Treatment Time:  na  
TREATMENT AREA =  Right hip pain [M25.551] SUBJECTIVE Pain Level (on 0 to 10 scale):  0  / 10 Medication Changes/New allergies or changes in medical history, any new surgeries or procedures? NO    If yes, update Summary List  
Subjective Functional Status/Changes:  []  No changes reported Patient reports he took a trip to [place] over the weekend and didn't experience any pain. Patient was unable to do his exercises but didn't notice residual symptoms because of this. OBJECTIVE Modalities Rationale:     decrease pain and increase tissue extensibility to improve patient's ability to perform transfers. min [] Estim, type/location:   
                                 []  att     []  unatt     []  w/US     []  w/ice    []  w/heat 
 min []  Mechanical Traction: type/lbs   
               []  pro   []  sup   []  int   []  cont    []  before manual    []  after manual  
 min []  Ultrasound, settings/location:    
 min []  Iontophoresis w/ dexamethasone, location:   
                                           []  take home patch       []  in clinic  
10 min []  Ice     [x]  Heat    location/position: To B hips in supine with bolster  
 min []  Vasopneumatic Device, press/temp:   
 min []  Other:   
[x] Skin assessment post-treatment (if applicable):   
[x]  intact    []  redness- no adverse reaction    
[]redness  adverse reaction:     
40 min Therapeutic Exercise:  [x]  See flow sheet Rationale:      increase ROM and increase strength to improve the patients ability to perform walking activities. 15 min Manual Therapy: STM to L glute med, L piriformis; L hip IR PROM Rationale:      decrease pain, increase ROM, increase tissue extensibility and decrease trigger points to improve patient's ability to perform work activities. min Patient Education:  YES  Reviewed HEP []  Progressed/Changed HEP based on: Other Objective/Functional Measures: 
 
Patient cued on goblet squat to maintain hip hinge mechanics and avoid anterior translation Continues to be limited with L hip IR PROM at onset of treatment, improves as treatment progresses Post Treatment Pain Level (on 0 to 10) scale:   0  / 10 ASSESSMENT Assessment/Changes in Function:  
 
Patient tolerated treatment session well. Patient demonstrating good knowledge of therapy program indicating good carry over between sessions. []  See Progress Note/Recertification Patient will continue to benefit from skilled PT services to modify and progress therapeutic interventions, address functional mobility deficits, address ROM deficits, address strength deficits, analyze and address soft tissue restrictions, analyze and cue movement patterns, analyze and modify body mechanics/ergonomics, assess and modify postural abnormalities and address imbalance/dizziness to attain remaining goals. Progress toward goals / Updated goals: 
Progressing with long term pain goals PLAN [x]  Upgrade activities as tolerated YES Continue plan of care  
[]  Discharge due to :   
[]  Other:   
 
Therapist: Jose Antonio Boston PT Date: 1/15/2019 Time: 9:59 AM  
 
Future Appointments Date Time Provider [place] 1/22/2019  9:00 AM Armond Christina, PT [facility]  
1/28/2019  7:30 AM Armond Christina, PT [facility]  
2/4/2019  7:30 AM Black Ball, PT [facility]

## 2019-01-22 ENCOUNTER — APPOINTMENT (OUTPATIENT)
Dept: PHYSICAL THERAPY | Age: 54
End: 2019-01-22
Payer: OTHER GOVERNMENT

## 2019-01-24 ENCOUNTER — HOSPITAL ENCOUNTER (OUTPATIENT)
Dept: PHYSICAL THERAPY | Age: 54
Discharge: HOME | End: 2019-01-24
Payer: OTHER GOVERNMENT

## 2019-01-24 PROCEDURE — 97140 MANUAL THERAPY 1/> REGIONS: CPT

## 2019-01-24 PROCEDURE — 97110 THERAPEUTIC EXERCISES: CPT

## 2019-01-24 NOTE — PROGRESS NOTES
PHYSICAL THERAPY - DAILY TREATMENT NOTE Patient Name: Louis Low        Date: 2019 : 1965   yes Patient  Verified Visit #:   15   of   16  Insurance: Payor: DALIA / Plan: Geovany Mahoney / Product Type: Allegra Cait / In time: 1026 Out time: 1135 Total Treatment Time: 71 Medicare/BCBS Time Tracking (below) Total Timed Codes (min):  na 1:1 Treatment Time:  na  
TREATMENT AREA =  Right hip pain [M25.551] SUBJECTIVE Pain Level (on 0 to 10 scale):  2  / 10 Medication Changes/New allergies or changes in medical history, any new surgeries or procedures?    no  If yes, update Summary List  
Subjective Functional Status/Changes:  []  No changes reported Patient reports he has been doing his exercise program at home and notices he may be over doing it because he gets sore. Patient feels like initiating yoga has helped. OBJECTIVE Modalities Rationale:     decrease pain and increase tissue extensibility to improve patient's ability to perform transfers. min [] Estim, type/location:   
                                 []  att     []  unatt     []  w/US     []  w/ice    []  w/heat 
 min []  Mechanical Traction: type/lbs   
               []  pro   []  sup   []  int   []  cont    []  before manual    []  after manual  
 min []  Ultrasound, settings/location:    
 min []  Iontophoresis w/ dexamethasone, location:   
                                           []  take home patch       []  in clinic  
10 min []  Ice     [x]  Heat    location/position: To B hips in supine with bolster  
 min []  Vasopneumatic Device, press/temp:   
 min []  Other:   
[x] Skin assessment post-treatment (if applicable):   
[x]  intact    []  redness- no adverse reaction    
[]redness  adverse reaction:     
44 min Therapeutic Exercise:  [x]  See flow sheet Rationale:      increase ROM and increase strength to improve the patients ability to perform walking activities. 15 min Manual Therapy: A. R.T to L proximal HS, STM to L glute Rationale:      decrease pain, increase ROM, increase tissue extensibility and decrease trigger points to improve patient's ability to perform standing activities. Billed With/As: 
 [x] TE 
 [] TA 
 [] Neuro 
 [] Self Care Patient Education: [x] Review HEP [] Progressed/Changed HEP based on:  
[] positioning   [] body mechanics   [] transfers   [] heat/ice application   
[] other:   
Other Objective/Functional Measures: Added BW lateral lunges this session in order to improve hip mobility as well as adductor flexibility, patient presenting with increased difficulty performing lunge to the L hip Reducing tenderness noted to L proximal HS compared to previous visits Post Treatment Pain Level (on 0 to 10) scale:   0  / 10 ASSESSMENT Assessment/Changes in Function:  
 
Patient educated to only perform resistance training 3 times per week in order to allow for proper recovery. Patient instructed he may perform yoga on the off days in order to promote improved flexibility and mobility. []  See Progress Note/Recertification Patient will continue to benefit from skilled PT services to modify and progress therapeutic interventions, address functional mobility deficits, address ROM deficits, address strength deficits, analyze and address soft tissue restrictions, analyze and cue movement patterns, analyze and modify body mechanics/ergonomics and assess and modify postural abnormalities to attain remaining goals. Progress toward goals / Updated goals: 
Progressing with LTG#2 per subjective reports PLAN [x]  Upgrade activities as tolerated yes Continue plan of care  
[]  Discharge due to :   
[]  Other:   
 
Therapist: Alexa Proctor, PT Date: 1/24/2019 Time: 11:59 AM  
 
Future Appointments Date Time Provider [place] 1/28/2019  7:30 AM Stefano York, PT [facility] 2/4/2019  7:30 AM Stef Kan, PT [facility]

## 2019-01-28 ENCOUNTER — HOSPITAL ENCOUNTER (OUTPATIENT)
Dept: PHYSICAL THERAPY | Age: 54
Discharge: HOME | End: 2019-01-28
Payer: OTHER GOVERNMENT

## 2019-01-28 PROCEDURE — 97140 MANUAL THERAPY 1/> REGIONS: CPT

## 2019-01-28 PROCEDURE — 97110 THERAPEUTIC EXERCISES: CPT

## 2019-01-28 NOTE — PROGRESS NOTES
PHYSICAL THERAPY - DAILY TREATMENT NOTE Patient Name: Susy Gomez        Date: 2019 : 1965   yes Patient  Verified Visit #:   15   of   16  Insurance: Payor:  / Plan: Oscar Baker / Product Type: Ricardo Stammer / In time: 730 Out time: 841 Total Treatment Time: 70 Medicare/BCBS Time Tracking (below) Total Timed Codes (min):  na 1:1 Treatment Time:  na  
TREATMENT AREA =  Right hip pain [M25.551] SUBJECTIVE Pain Level (on 0 to 10 scale):  0  / 10 Medication Changes/New allergies or changes in medical history, any new surgeries or procedures?    no  If yes, update Summary List  
Subjective Functional Status/Changes:  []  No changes reported Patient states he has noticed his symptoms improve if he gives himself a rest day during the week. Patient reports he has remained consistent with all of his exercises. OBJECTIVE Modalities Rationale:     decrease pain and increase tissue extensibility to improve patient's ability to perform transfers. min [] Estim, type/location:   
                                 []  att     []  unatt     []  w/US     []  w/ice    []  w/heat 
 min []  Mechanical Traction: type/lbs   
               []  pro   []  sup   []  int   []  cont    []  before manual    []  after manual  
 min []  Ultrasound, settings/location:    
 min []  Iontophoresis w/ dexamethasone, location:   
                                           []  take home patch       []  in clinic  
10 min []  Ice     [x]  Heat    location/position: To B hips in supine with bolster  
 min []  Vasopneumatic Device, press/temp:   
 min []  Other:   
[x] Skin assessment post-treatment (if applicable):   
[x]  intact    []  redness- no adverse reaction    
[]redness  adverse reaction:     
46 min Therapeutic Exercise:  [x]  See flow sheet Rationale:      increase ROM and increase strength to improve the patients ability to perform walking activities. 15 min Manual Therapy: STM to L glute max, L proximal HS; L hip flexion, add and ER stretch Rationale:      decrease pain, increase ROM, increase tissue extensibility and decrease trigger points to improve patient's ability to perform standing activities. Billed With/As: 
 [x] TE 
 [] TA 
 [] Neuro 
 [] Self Care Patient Education: [x] Review HEP [] Progressed/Changed HEP based on:  
[] positioning   [] body mechanics   [] transfers   [] heat/ice application   
[] other:   
Other Objective/Functional Measures: FOTO 83/100 Added BW lateral lunges as part of regular program 
Continued tenderness to L proximal HS during MT Post Treatment Pain Level (on 0 to 10) scale:   0  / 10 ASSESSMENT Assessment/Changes in Function:  
 
Patient tolerated treatment session well. Plan to progress patient to discharge at [place] [address] and will provide updated copy of exercise program for independent use. []  See Progress Note/Recertification Patient will continue to benefit from skilled PT services to modify and progress therapeutic interventions, address functional mobility deficits, address ROM deficits, address strength deficits, analyze and address soft tissue restrictions, analyze and cue movement patterns, analyze and modify body mechanics/ergonomics and assess and modify postural abnormalities to attain remaining goals. Progress toward goals / Updated goals: 
Met LTG#1; FOTO 83/100 PLAN [x]  Upgrade activities as tolerated yes Continue plan of care  
[]  Discharge due to :   
[]  Other:   
 
Therapist: Bennett Tay, PT Date: 1/28/2019 Time: 8:33 AM  
 
Future Appointments Date Time Provider [place] 2/4/2019  7:30 AM Kari Key, PT [facility]

## 2019-02-04 ENCOUNTER — HOSPITAL ENCOUNTER (OUTPATIENT)
Dept: PHYSICAL THERAPY | Age: 54
Discharge: HOME | End: 2019-02-04
Payer: OTHER GOVERNMENT

## 2019-02-04 PROCEDURE — 97140 MANUAL THERAPY 1/> REGIONS: CPT

## 2019-02-04 PROCEDURE — 97110 THERAPEUTIC EXERCISES: CPT

## 2019-02-04 NOTE — PROGRESS NOTES
PHYSICAL THERAPY - DAILY TREATMENT NOTE    Patient Name: Armida Loving        Date: 2019  : 1965   yes Patient  Verified  Visit #:      16  Insurance: Payor:  / Plan: Leeann Sue / Product Type:  /      In time: 865 Out time: 555   Total Treatment Time: 69     Medicare/BCBS Time Tracking (below)   Total Timed Codes (min):  na 1:1 Treatment Time:  na     TREATMENT AREA =  Right hip pain [M25.551]    SUBJECTIVE  Pain Level (on 0 to 10 scale):  0  / 10   Medication Changes/New allergies or changes in medical history, any new surgeries or procedures?    no  If yes, update Summary List   Subjective Functional Status/Changes:  []  No changes reported     Patient reports a good improvement in symptoms since the start of PT. Patient states his pain at worst is 1/10 and on average 0/10. Patient reports compliance with his HEP without complicaiton. OBJECTIVE      55 min Therapeutic Exercise:  [x]  See flow sheet   Rationale:      increase ROM and increase strength to improve the patients ability to perform walking activities. 14 min Manual Therapy: STM to L proximal HS, L glute med; L hip flexion, adduction and ER stretch   Rationale:      decrease pain, increase ROM, increase tissue extensibility and decrease trigger points to improve patient's ability to perform standing activities.      Billed With/As:   [x] TE   [] TA   [] Neuro   [] Self Care Patient Education: [x] Review HEP    [] Progressed/Changed HEP based on:   [] positioning   [] body mechanics   [] transfers   [] heat/ice application    [] other:      Other Objective/Functional Measures:    See DC note     Post Treatment Pain Level (on 0 to 10) scale:   0  / 10     ASSESSMENT  Assessment/Changes in Function:     See DC note           Progress toward goals / Updated goals:    See DC note     PLAN  []  Upgrade activities as tolerated no Continue plan of care   [x]  Discharge due to : Met all goals   [] Other:      Therapist: Alexa Proctor, PT    Date: 2/4/2019 Time: 9:30 AM     No future appointments.

## 2019-02-04 NOTE — PROGRESS NOTES
[address], [address] [facility] - Phone: [phone]  Fax: [phone] SUMMARY  Patient Name: Sam Suh : 1965   Treatment/Medical Diagnosis: Right hip pain [M25.551]   Referral Source: Cherelle Najera MD     Date of Initial Visit: 18 Attended Visits: 15 Missed Visits: 0     SUMMARY OF TREATMENT  Patient has attended 14 follow up visits since his initial evaluation on 18. Patient has received therapeutic exercise, manual therapy and modalities in order to improve B hip ROM, mobility, flexibility, strength, stability, soft tissue extensibility and pain reduction. CURRENT STATUS  Patient has progressed well with his PT program since his last PN on 19. Patient demonstrates an excellent knowledge of his exercise program which has contributed to improved function and reduced pain. Patient's B hip strength has improved grossly to 4+/5 for all planes. Patient also notes a significant reduction in his daily pain levels with his pain at worst a 1/10. At this time, patient is appropriate to DC to HEP and will continue with his exercises independently. Goal/Measure of Progress Goal Met? 1. Patient will improve FOTO to >/= 78/100 in order to improve quality of life   Status at last Eval: 76/100 Current Status: 83/100 yes   2. Patient will report reduction in pain at worst to 1-2/10 in order to improve tolerance to recreational activities   Status at last Eval: 3-4/10 Current Status: 1/10 yes   3. Patient will report a +5 on the GROC in order to prepare for DC to HEP   Status at last Eval: n/a Current Status: +5 yes     RECOMMENDATIONS  Discontinue therapy. Progressing towards or have reached established goals. If you have any questions/comments please contact us directly at [phone]. Thank you for allowing us to assist in the care of your patient. Therapist Signature:  Raynell Dandy Carey Perdomo, PT Date: 2/4/19     Time: 7:45 AM

## 2020-08-24 ENCOUNTER — HOSPITAL ENCOUNTER (OUTPATIENT)
Dept: PHYSICAL THERAPY | Age: 55
Discharge: HOME | End: 2020-08-24
Payer: OTHER GOVERNMENT

## 2020-08-24 PROCEDURE — 97110 THERAPEUTIC EXERCISES: CPT

## 2020-08-24 PROCEDURE — 97162 PT EVAL MOD COMPLEX 30 MIN: CPT

## 2020-08-24 NOTE — PROGRESS NOTES
PHYSICAL THERAPY - DAILY TREATMENT NOTE    Patient Name: Mala Laura        Date: 2020  : 1965   yes Patient  Verified  Visit #:      12  Insurance: Payor: DALIA / Plan: [address] / Product Type:  /      In time: 345 Out time: 425   Total Treatment Time: 40     Medicare/[facility] Time Tracking (below)   Total Timed Codes (min):  na 1:1 Treatment Time:  na     TREATMENT AREA =  Pain in left buttock [M79.18]    SUBJECTIVE  Pain Level (on 0 to 10 scale):  2  / 10   Medication Changes/New allergies or changes in medical history, any new surgeries or procedures?    no  If yes, update Summary List   Subjective Functional Status/Changes:  []  No changes reported     See POC          OBJECTIVE    10 min Therapeutic Exercise:  [x]  See flow sheet   Rationale:      increase ROM and increase strength to improve the patients ability to perform walking activities. Billed With/As:   [] TE   [] TA   [] Neuro   [] Self Care Patient Education: [x] Review HEP    [] Progressed/Changed HEP based on:   [] positioning   [] body mechanics   [] transfers   [] heat/ice application    [] other:      Other Objective/Functional Measures:    See POC     Post Treatment Pain Level (on 0 to 10) scale:   2  / 10     ASSESSMENT  Assessment/Changes in Function:     See POC     []  See Progress Note/Recertification   Patient will continue to benefit from skilled PT services to modify and progress therapeutic interventions, address functional mobility deficits, address ROM deficits, address strength deficits, analyze and address soft tissue restrictions, analyze and cue movement patterns, analyze and modify body mechanics/ergonomics and assess and modify postural abnormalities to attain remaining goals.    Progress toward goals / Updated goals:    See POC     PLAN  [x]  Upgrade activities as tolerated yes Continue plan of care   []  Discharge due to :    []  Other:      Therapist: Washington Victor, PT Date: 8/24/2020 Time: 4:38 PM     Future Appointments   Date Time Provider [place]   9/9/2020  3:30 PM Gennaro Vo [facility]   9/11/2020  3:00 PM [facility]   9/15/2020  2:45 PM Amber Oliveira, PT [facility]   9/17/2020  3:00 PM Amber Oliveira, PT [facility]   9/22/2020  3:30 PM Amber Oliveira, PT [facility]   9/24/2020  3:45 PM Gopi Quijano, PT [facility]

## 2020-08-24 NOTE — PROGRESS NOTES
[address] PHYSICAL THERAPY    [address] [place] [address], [facility] 201,[facility], 70 [facility]       Phone: [phone]  Fax: [phone] / [address] [facility]  Patient Name: Nevaeh Walters : 1965   Medical   Diagnosis: L Proximal HS strain Treatment Diagnosis: Pain in left buttock [M79.18]   Onset Date: Chronic     Referral Source: Chintan Tolbert,* Start of Care [facility]): 2020   Prior Hospitalization: See medical history Provider #: [id number]   Prior Level of Function: Chronic history of difficulty with prolonged sitting, stretching   Comorbidities: HTN   Medications: Verified on Patient Summary List   The Plan of Care and following information is based on the information from the initial evaluation.   ===========================================================================================  Assessment / key information:  Patient is a 50year old male presenting to therapy with signs and symptoms consistent with L proximal HS strain. Patient reports his symptoms began in 2018 after performing the inverted leg press. Patient states he felt a pop just below his glute, but didn't feel immediate pain. Patient states a few days later he was sitting on a firm stool and the pain increased. Since this time, he has been experiencing pain on and off and can never seem to make it completely go away. Patient reports increased difficulty with prolonged sitting, and stretching. Patient notes symptoms feel better with yoga. Patient rates pain at worst 7/10 and 0/10 at best.   Objective Data: Inspection-Patient demonstrates normal gait mechanics in clinic. Lumbar Spine AROM: flex hands to shins, ext 50%, R Rot 50%, L Rot 50%. Strength Testing: Hip ext R 5/5, L 4/5; abd R 5/5, L 4+/5; Knee Flex R 5/5, L 4/5. SL glute bridge iso: R 10 seconds with good form, L immediate loss of form. Flexibility Testing:  Moderate restriction to B HS, B piriformis. Tenderness to L proximal HS. FOTO: 67/100. Patient educated on diagnosis, prognosis, POC and HEP. Patient issued copy of HEP and denied additional questions. Patient will benefit from skilled PT in order to address these impairments and functional limitations.   ===========================================================================================  Eval Complexity: History MEDIUM  Complexity : 1-2 comorbidities / personal factors will impact the outcome/ POC ;  Examination  HIGH Complexity : 4+ Standardized tests and measures addressing body structure, function, activity limitation and / or participation in recreation ; Presentation MEDIUM Complexity : Evolving with changing characteristics ; Decision Making MEDIUM Complexity : FOTO score of 26-74; Overall Complexity MEDIUM  Problem List: pain affecting function, decrease ROM, decrease strength, decrease ADL/ functional abilitiies, decrease activity tolerance and decrease flexibility/ joint mobility   Treatment Plan may include any combination of the following: Therapeutic exercise, Therapeutic activities, Neuromuscular re-education, Physical agent/modality, Manual therapy, Patient education and Functional mobility training  Patient / Family readiness to learn indicated by: asking questions, trying to perform skills and interest  Persons(s) to be included in education: patient (P)  Barriers to Learning/Limitations: no  Measures taken:    Patient Goal (s): Eliminate pain   Patient self reported health status: good  Rehabilitation Potential: good   Short Term Goals: To be accomplished in  3  weeks:  1. Patient will demonstrate independence with HEP for self management of symptoms. 2. Patient will report reduction in pain at worst to 3-4/10 in order to improve tolerance to exercise activities.  Long Term Goals: To be accomplished in  6  weeks:  1. Patient will improve FOTO to >/= 77/100 in order to improve quality of life.   2. Patient will report reduction in pain at worst to 1-2/10 in order to improve tolerance to recreational activities. 3. Patient will improve L LE strength to 5/5 for all planes in order to improve tolerance to household activities. Frequency / Duration:   Patient to be seen  2  times per week for 6  weeks:  Patient / Caregiver education and instruction: self care, activity modification and exercises  G-Codes (GP): cecille  Therapist Signature: Judi Li, PT Date: 6/52/7814   Certification Period: na Time: 4:31 PM   ===========================================================================================  I certify that the above Physical Therapy Services are being furnished while the patient is under my care. I agree with the treatment plan and certify that this therapy is necessary. Physician Signature:        Date:       Time:     Please sign and return to [facility] or you may fax the signed copy to [phone]. Thank you.

## 2020-08-26 ENCOUNTER — APPOINTMENT (OUTPATIENT)
Dept: PHYSICAL THERAPY | Age: 55
End: 2020-08-26
Payer: OTHER GOVERNMENT

## 2020-09-09 ENCOUNTER — HOSPITAL ENCOUNTER (OUTPATIENT)
Dept: PHYSICAL THERAPY | Age: 55
Discharge: HOME | End: 2020-09-09
Payer: OTHER GOVERNMENT

## 2020-09-09 PROCEDURE — 97110 THERAPEUTIC EXERCISES: CPT

## 2020-09-09 PROCEDURE — 97140 MANUAL THERAPY 1/> REGIONS: CPT

## 2020-09-09 NOTE — PROGRESS NOTES
PHYSICAL THERAPY - DAILY TREATMENT NOTE    Patient Name: Clement Silva        Date: 2020  : 1965   yes Patient  Verified  Visit #:      12  Insurance: Payor: DALIA / Plan: [address] / Product Type:  /      In time: 335 Out time: 415   Total Treatment Time: 40     Medicare/[facility] Time Tracking (below)   Total Timed Codes (min):  30 1:1 Treatment Time:       TREATMENT AREA =  Pain in buttock [M79.18]    SUBJECTIVE  Pain Level (on 0 to 10 scale):  4  / 10   Medication Changes/New allergies or changes in medical history, any new surgeries or procedures?    no  If yes, update Summary List   Subjective Functional Status/Changes:  []  No changes reported     I'm a pretty active orin, play some lacrosse with my son and I . Occasionally I'll get some pain at the bottom of my glute. I did the exercises. OBJECTIVE  Modalities Rationale:     decrease inflammation and decrease pain to improve patient's ability to perform pain free ADLs. min [] Estim, type/location:                                      []  att     []  unatt     []  w/US     []  w/ice    []  w/heat    min []  Mechanical Traction: type/lbs                   []  pro   []  sup   []  int   []  cont    []  before manual    []  after manual    min []  Ultrasound, settings/location:      min []  Iontophoresis w/ dexamethasone, location:                                               []  take home patch       []  in clinic   10 min [x]  Ice     []  Heat    location/position: Prone, (L) glute.      min []  Vasopneumatic Device, press/temp:     min []  Other:    [x] Skin assessment post-treatment (if applicable):    [x]  intact    []  redness- no adverse reaction     []redness  adverse reaction:        15 min Therapeutic Exercise:  [x]  See flow sheet   Rationale:      increase ROM, increase strength and improve coordination to improve the patients ability to perform pain free ADLs.       15 Min Manual Therapy: STM/DTM and TPR (L) HS origin. Rationale:      decrease pain, increase ROM, increase tissue extensibility and decrease trigger points to improve patient's ability to perform pain free ADLs. Billed With/As:   [x] TE   [] TA   [] Neuro   [] Self Care Patient Education: [x] Review HEP    [] Progressed/Changed HEP based on:   [] positioning   [] body mechanics   [] transfers   [] heat/ice application    [] other:      Other Objective/Functional Measures: Added multiple exercises to improve LE strength and stability for ADLs. Post Treatment Pain Level (on 0 to 10) scale:   0  / 10     ASSESSMENT  Assessment/Changes in Function:     TTP at HS origin. No issues with therex today. []  See Progress Note/Recertification   Patient will continue to benefit from skilled PT services to modify and progress therapeutic interventions, address functional mobility deficits, address ROM deficits, address strength deficits, analyze and address soft tissue restrictions, analyze and cue movement patterns, analyze and modify body mechanics/ergonomics and assess and modify postural abnormalities to attain remaining goals. Progress toward goals / Updated goals:    Initiated therex.       PLAN  [x]  Upgrade activities as tolerated yes Continue plan of care   []  Discharge due to :    []  Other:      Therapist: Keegan Ayala PTA    Date: 9/9/2020 Time: 4:15 PM     Future Appointments   Date Time Provider [place]   9/11/2020  3:00 PM Bethany Hendrickson [facility]   9/15/2020  2:45 PM Quentin Holloway [facility]   9/17/2020  3:00 PM Quentin Holloway [facility]   9/22/2020  3:30 PM Quentin Holloway [facility]   9/24/2020  3:45 PM Vicki Miller, [facility]

## 2020-09-11 ENCOUNTER — HOSPITAL ENCOUNTER (OUTPATIENT)
Dept: PHYSICAL THERAPY | Age: 55
Discharge: HOME | End: 2020-09-11
Payer: OTHER GOVERNMENT

## 2020-09-11 PROCEDURE — 97140 MANUAL THERAPY 1/> REGIONS: CPT

## 2020-09-11 PROCEDURE — 97110 THERAPEUTIC EXERCISES: CPT

## 2020-09-11 NOTE — PROGRESS NOTES
PHYSICAL THERAPY - DAILY TREATMENT NOTE    Patient Name: Nevaeh Walters        Date: 2020  : 1965   yes Patient  Verified  Visit #:   3   of   12  Insurance: Payor: DALIA / Plan: [address] / Product Type:  /      In time: 3 Out time: 355   Total Treatment Time: 55     Medicare/[employer] Time Tracking (below)   Total Timed Codes (min):  55 1:1 Treatment Time:       TREATMENT AREA =  Pain in buttock [M79.18]    SUBJECTIVE  Pain Level (on 0 to 10 scale):  4  / 10   Medication Changes/New allergies or changes in medical history, any new surgeries or procedures?    no  If yes, update Summary List   Subjective Functional Status/Changes:  []  No changes reported      No new complaints. OBJECTIVE  40 min Therapeutic Exercise:  [x]  See flow sheet   Rationale:      increase ROM, increase strength and improve coordination to improve the patients ability to perform pain free ADLs. 15 Min Manual Therapy: STM/DTM and TPR (L) HS. Rationale:      decrease pain, increase ROM, increase tissue extensibility and decrease trigger points to improve patient's ability to perform pain free ADLs. Billed With/As:   [x] TE   [] TA   [] Neuro   [] Self Care Patient Education: [x] Review HEP    [] Progressed/Changed HEP based on:   [] positioning   [] body mechanics   [] transfers   [] heat/ice application    [] other:      Other Objective/Functional Measures: Therex per flow sheet. Post Treatment Pain Level (on 0 to 10) scale:   0   / 10     ASSESSMENT  Assessment/Changes in Function:     No exacerbation of symptoms with today's session.       []  See Progress Note/Recertification   Patient will continue to benefit from skilled PT services to modify and progress therapeutic interventions, address functional mobility deficits, address ROM deficits, address strength deficits, analyze and address soft tissue restrictions, analyze and cue movement patterns, analyze and modify body mechanics/ergonomics and assess and modify postural abnormalities to attain remaining goals. Progress toward goals / Updated goals:    No change in progress toward LTG's with today's session.       PLAN  [x]  Upgrade activities as tolerated yes Continue plan of care   []  Discharge due to :    []  Other:      Therapist: Mame Reyes PTA    Date: 9/11/2020 Time: 4:01 PM     Future Appointments   Date Time Provider [place]   9/15/2020  2:45 PM Jaelyn Jolly, PT [facility]   9/17/2020  3:00 PM Jaelyn Jolly, PT [facility]   9/22/2020  3:30 PM Jaelyn Jolly, PT [facility]   9/24/2020  3:45 PM Jaime Yee, PT [id number]

## 2020-09-15 ENCOUNTER — HOSPITAL ENCOUNTER (OUTPATIENT)
Dept: PHYSICAL THERAPY | Age: 55
Discharge: HOME | End: 2020-09-15
Payer: OTHER GOVERNMENT

## 2020-09-15 PROCEDURE — 97140 MANUAL THERAPY 1/> REGIONS: CPT

## 2020-09-15 PROCEDURE — 97110 THERAPEUTIC EXERCISES: CPT

## 2020-09-15 NOTE — PROGRESS NOTES
PHYSICAL THERAPY - DAILY TREATMENT NOTE    Patient Name: Clement Silva        Date: 9/15/2020  : 1965   yes Patient  Verified  Visit #:      of   12  Insurance: Payor:  / Plan: [address] / Product Type:  /      In time: 244 Out time: 340   Total Treatment Time: 56     Medicare/[facility] Time Tracking (below)   Total Timed Codes (min):  na 1:1 Treatment Time:  na     TREATMENT AREA =  Pain in buttock [M79.18]    SUBJECTIVE  Pain Level (on 0 to 10 scale):  2.5  / 10   Medication Changes/New allergies or changes in medical history, any new surgeries or procedures?    no  If yes, update Summary List   Subjective Functional Status/Changes:  []  No changes reported     Patient reports he has been doing better with not stretching as aggressively and that he is able to sit longer with less overall pain. OBJECTIVE  Modalities Rationale:     decrease inflammation and decrease pain to improve patient's ability to perform transfers. min [] Estim, type/location:                                      []  att     []  unatt     []  w/US     []  w/ice    []  w/heat    min []  Mechanical Traction: type/lbs                   []  pro   []  sup   []  int   []  cont    []  before manual    []  after manual    min []  Ultrasound, settings/location:      min []  Iontophoresis w/ dexamethasone, location:                                               []  take home patch       []  in clinic   10 min [x]  Ice     []  Heat    location/position: To L proximal HS in prone    min []  Vasopneumatic Device, press/temp:     min []  Other:    [x] Skin assessment post-treatment (if applicable):    [x]  intact    []  redness- no adverse reaction     []redness  adverse reaction:        31 min Therapeutic Exercise:  [x]  See flow sheet   Rationale:      increase ROM and increase strength to improve the patients ability to perform walking activities.       15 min Manual Therapy: STM to L glute med, L proximal HS, L piriformis   Rationale:      decrease pain, increase ROM, increase tissue extensibility and decrease trigger points to improve patient's ability to perform recreational activities. Billed With/As:   [x] TE   [] TA   [] Neuro   [] Self Care Patient Education: [x] Review HEP    [] Progressed/Changed HEP based on:   [] positioning   [] body mechanics   [] transfers   [] heat/ice application    [] other:      Other Objective/Functional Measures: Added 6kg KB to RDL this session, patient demonstrated good posterior weight shift and appropriate eccentric loading of HS  Tenderness continues at L proximal HS during MT treatment     Post Treatment Pain Level (on 0 to 10) scale:   1  / 10     ASSESSMENT  Assessment/Changes in Function:     Patient tolerated progression of program well. Patient instructed on performing eccentric loading of HS during home exercises in order to promote healing process and increase strength. []  See Progress Note/Recertification   Patient will continue to benefit from skilled PT services to modify and progress therapeutic interventions, address functional mobility deficits, address ROM deficits, address strength deficits, analyze and address soft tissue restrictions, analyze and cue movement patterns, analyze and modify body mechanics/ergonomics and assess and modify postural abnormalities to attain remaining goals.    Progress toward goals / Updated goals:    Progressing with LTG#2     PLAN  [x]  Upgrade activities as tolerated yes Continue plan of care   []  Discharge due to :    []  Other:      Therapist: Parul Foster PT    Date: 9/15/2020 Time: 4:01 PM     Future Appointments   Date Time Provider [place]   9/17/2020  3:00 PM Ermelinda Hampton PT [facility] [address]   9/22/2020  3:30 PM Ermelinda Hampton PT [facility] [address]   9/24/2020  3:45 PM LUCY [address]

## 2020-09-17 ENCOUNTER — HOSPITAL ENCOUNTER (OUTPATIENT)
Dept: PHYSICAL THERAPY | Age: 55
Discharge: HOME | End: 2020-09-17
Payer: OTHER GOVERNMENT

## 2020-09-17 PROCEDURE — 97140 MANUAL THERAPY 1/> REGIONS: CPT

## 2020-09-17 PROCEDURE — 97110 THERAPEUTIC EXERCISES: CPT

## 2020-09-17 NOTE — PROGRESS NOTES
PHYSICAL THERAPY - DAILY TREATMENT NOTE    Patient Name: Selena Plata        Date: 2020  : 1965   yes Patient  Verified  Visit #:      of   12  Insurance: Payor: DALIA / Plan: [address] / Product Type:  /      In time: 300 Out time: 356   Total Treatment Time: 56     Medicare/[facility] Time Tracking (below)   Total Timed Codes (min):  na 1:1 Treatment Time:  na     TREATMENT AREA =  Pain in buttock [M79.18]    SUBJECTIVE  Pain Level (on 0 to 10 scale):  2  / 10   Medication Changes/New allergies or changes in medical history, any new surgeries or procedures?    no  If yes, update Summary List   Subjective Functional Status/Changes:  []  No changes reported     Patient reports he was feeling good yesterday but is sore today. Patient states he tried to clean out his gutters yesterday and thinks it may be from that. OBJECTIVE  Modalities Rationale:     decrease inflammation and decrease pain to improve patient's ability to perform transfers. min [] Estim, type/location:                                      []  att     []  unatt     []  w/US     []  w/ice    []  w/heat    min []  Mechanical Traction: type/lbs                   []  pro   []  sup   []  int   []  cont    []  before manual    []  after manual    min []  Ultrasound, settings/location:      min []  Iontophoresis w/ dexamethasone, location:                                               []  take home patch       []  in clinic   10 min [x]  Ice     []  Heat    location/position: To L proximal HS in prone    min []  Vasopneumatic Device, press/temp:     min []  Other:    [x] Skin assessment post-treatment (if applicable):    [x]  intact    []  redness- no adverse reaction     []redness  adverse reaction:        28 min Therapeutic Exercise:  [x]  See flow sheet   Rationale:      increase ROM and increase strength to improve the patients ability to perform work activities.       18 min Manual Therapy: STM to L glute med, L proximal HS, R psoas, R TFL   Rationale:      decrease pain, increase ROM, increase tissue extensibility and decrease trigger points to improve patient's ability to perform walking activities. Billed With/As:   [x] TE   [] TA   [] Neuro   [] Self Care Patient Education: [x] Review HEP    [] Progressed/Changed HEP based on:   [] positioning   [] body mechanics   [] transfers   [] heat/ice application    [] other:      Other Objective/Functional Measures: Added retro walk SL RDL this session for improved stability and eccentric HS strength  Continues with tenderness to L proximal HS during MT treatment     Post Treatment Pain Level (on 0 to 10) scale:   0  / 10     ASSESSMENT  Assessment/Changes in Function:     Patient tolerated treatment session well. Patient progressing well with his PT program at this time and would benefit from future PT in order to reduce pain with recreational activities. []  See Progress Note/Recertification   Patient will continue to benefit from skilled PT services to modify and progress therapeutic interventions, address functional mobility deficits, address ROM deficits, address strength deficits, analyze and address soft tissue restrictions, analyze and cue movement patterns, analyze and modify body mechanics/ergonomics and assess and modify postural abnormalities to attain remaining goals.    Progress toward goals / Updated goals:    Progressing with long term LE strength     PLAN  [x]  Upgrade activities as tolerated yes Continue plan of care   []  Discharge due to :    []  Other:      Therapist: Reynaldo Dias PT    Date: 9/17/2020 Time: 4:02 PM     Future Appointments   Date Time Provider [place]   9/22/2020  3:30 PM Justina Mejia PT [facility]   9/24/2020  3:45 PM Justina Mejia PT [facility]   9/29/2020  8:00 AM Justina Mejia PT [place] [address]   10/2/2020  8:00 AM Dewane Galeazzi [facility]   10/6/2020  3:30 PM Lois Ramirez, PT [facility]   10/9/2020  3:00 PM Gume Vivas [facility]   10/13/2020  3:30 PM Rogelio Villasenor, PT [facility]   10/16/2020  3:00 PM Gume Hernandez [address]

## 2020-09-22 ENCOUNTER — HOSPITAL ENCOUNTER (OUTPATIENT)
Dept: PHYSICAL THERAPY | Age: 55
Discharge: HOME | End: 2020-09-22
Payer: OTHER GOVERNMENT

## 2020-09-22 PROCEDURE — 97140 MANUAL THERAPY 1/> REGIONS: CPT

## 2020-09-22 PROCEDURE — 97110 THERAPEUTIC EXERCISES: CPT

## 2020-09-22 NOTE — PROGRESS NOTES
PHYSICAL THERAPY - DAILY TREATMENT NOTE    Patient Name: Jailene Carrillo        Date: 2020  : 1965   yes Patient  Verified  Visit #:      12  Insurance: Payor: DALIA / Plan: [address] / Product Type:  /      In time: 329 Out time: 425   Total Treatment Time: 56     Medicare/[facility] [employer] Time Tracking (below)   Total Timed Codes (min):  na 1:1 Treatment Time:  na     TREATMENT AREA =  Pain in buttock [M79.18]    SUBJECTIVE  Pain Level (on 0 to 10 scale):  2  / 10   Medication Changes/New allergies or changes in medical history, any new surgeries or procedures?    no  If yes, update Summary List   Subjective Functional Status/Changes:  []  No changes reported     Patient reports a 40% improvement in symptoms since the start of therapy. Patient rates his pain at worst as a 4/10 over the past 1-2 weeks. Patient feels like his ability to sit in both the car and at work has improved. OBJECTIVE  Modalities Rationale:     decrease inflammation and decrease pain to improve patient's ability to perform work activities.     min [] Estim, type/location:                                      []  att     []  unatt     []  w/US     []  w/ice    []  w/heat    min []  Mechanical Traction: type/lbs                   []  pro   []  sup   []  int   []  cont    []  before manual    []  after manual    min []  Ultrasound, settings/location:      min []  Iontophoresis w/ dexamethasone, location:                                               []  take home patch       []  in clinic   10 min [x]  Ice     []  Heat    location/position: To L hip in prone    min []  Vasopneumatic Device, press/temp:     min []  Other:    [x] Skin assessment post-treatment (if applicable):    [x]  intact    []  redness- no adverse reaction     []redness  adverse reaction:        31 min Therapeutic Exercise:  [x]  See flow sheet   Rationale:      increase ROM and increase strength to improve the patients ability to perform walking activities. 15 min Manual Therapy: STM to B IP, B RF; STM to L proximal HS   Rationale:      decrease pain, increase ROM, increase tissue extensibility and decrease trigger points to improve patient's ability to perform standing activities. Billed With/As:   [x] TE   [] TA   [] Neuro   [] Self Care Patient Education: [x] Review HEP    [] Progressed/Changed HEP based on:   [] positioning   [] body mechanics   [] transfers   [] heat/ice application    [] other:      Other Objective/Functional Measures:    See PN     Post Treatment Pain Level (on 0 to 10) scale:   0  / 10     ASSESSMENT  Assessment/Changes in Function:     See PN     []  See Progress Note/Recertification   Patient will continue to benefit from skilled PT services to modify and progress therapeutic interventions, address functional mobility deficits, address ROM deficits, address strength deficits, analyze and address soft tissue restrictions, analyze and cue movement patterns, analyze and modify body mechanics/ergonomics and assess and modify postural abnormalities to attain remaining goals.    Progress toward goals / Updated goals:    See PN     PLAN  [x]  Upgrade activities as tolerated yes Continue plan of care   []  Discharge due to :    []  Other:      Therapist: Jonna Bai, PT    Date: 9/22/2020 Time: 4:46 PM     Future Appointments   Date Time Provider [place]   9/24/2020  3:45 PM Sebastián Ravi,  [address] [facility]   9/29/2020  8:00 AM Sebastián Ravi, PT [place] [address]   10/2/2020  8:00 AM Werner Rad [address] [facility]   10/6/2020  3:30 PM Sebastián Ravi,  [address] [facility]   10/9/2020  3:00 PM Sutton Rad [address] [facility]   10/13/2020  3:30 PM Sebastián Ravi,  [address] [facility]   10/16/2020  3:00 PM Jennell Lesch, PTA [place] [address]

## 2020-09-22 NOTE — PROGRESS NOTES
[address]  [facility] THERAPY  [address],[facility], [facility] - Phone: [phone]  Fax: [phone]  PROGRESS NOTE  Patient Name: Charlette Mccormick : 1965   Treatment/Medical Diagnosis: Pain in buttock [M79.18]   Referral Source: Luiza Smith,*     Date of Initial Visit: 20 Attended Visits: 6 Missed Visits: 0     SUMMARY OF TREATMENT  Patient has attended 5 follow up visits since his initial evaluation on 20. Patient has received therapeutic exercise, manual therapy and modalities in order to improve L LE ROM, mobility, flexibility, strength, soft tissue extensibility and pain reduction. CURRENT STATUS  Patient reports a 40% improvement in symptoms since the start of PT. Patient states his pain levels have reduced to 4/10 at worst and is able to manage his symptoms with self myofascial release at home. Patient's strength has improved as listed below, allowing for improved tolerance to recreational activities. Patient's improved FOTO score to 82/100 indicates improved ADL function. Patient would benefit from continued PT in order to further reduce pain, improve strength and help return to his PLOF. Goal/Measure of Progress Goal Met? 1. Patient will improve FOTO to >/= 77/100 in order to improve quality of life   Status at last Eval: 67/100 Current Status: 82/100 yes   2. Patient will report reduction in pain at worst to 1-2/10 in order to improve tolerance to recreational activities. Status at last Eval: 7/10 Current Status: 4/10 progressing   3. Patient will improve L LE strength to 5/5 for all planes in order to improve tolerance to household activities. Status at last Eval: Strength Testing: Hip ext R 5/5, L 4/5; abd R 5/5, L 4+/5; Knee Flex R 5/5, L 4/5 Current Status: L LE strength: hip ext 4+/5, abd 5/5, knee flex 4+/5 progressing     New Goals to be achieved in __4__  weeks:  1.  Patient will demonstrate independence with advanced HEP in preparation for DC to HEP. 2. Continue with LTG#2  3. Continue with LTG#3  RECOMMENDATIONS  Patient would benefit from continued PT 2x/week for 4 weeks in order to further address impairments and functional limitations. If you have any questions/comments please contact us directly at [phone]. Thank you for allowing us to assist in the care of your patient. Therapist Signature: Jean White PT Date: 9/22/2020     Time: 4:24 PM   NOTE TO PHYSICIAN:  PLEASE COMPLETE THE ORDERS BELOW AND FAX TO   [facility]: [phone]  If you are unable to process this request in 24 hours please contact our office: [phone]    ___ I have read the above report and request that my patient continue as recommended.   ___ I have read the above report and request that my patient continue therapy with the following changes/special instructions:_________________________________________________________   ___ I have read the above report and request that my patient be discharged from therapy.      Physician Signature:        Date:       Time:

## 2020-09-24 ENCOUNTER — HOSPITAL ENCOUNTER (OUTPATIENT)
Dept: PHYSICAL THERAPY | Age: 55
Discharge: HOME | End: 2020-09-24
Payer: OTHER GOVERNMENT

## 2020-09-24 PROCEDURE — 97140 MANUAL THERAPY 1/> REGIONS: CPT

## 2020-09-24 PROCEDURE — 97110 THERAPEUTIC EXERCISES: CPT

## 2020-09-24 NOTE — PROGRESS NOTES
PHYSICAL THERAPY - DAILY TREATMENT NOTE    Patient Name: Bruno Li        Date: 2020  : 1965   yes Patient  Verified  Visit #:      12  Insurance: Payor: DALIA / Plan: [address] / Product Type:  /      In time: 349 Out time: 440   Total Treatment Time: 51     Medicare/[employer] Time Tracking (below)   Total Timed Codes (min):  na 1:1 Treatment Time:  na     TREATMENT AREA =  Pain in buttock [M79.18]    SUBJECTIVE  Pain Level (on 0 to 10 scale):  2  / 10   Medication Changes/New allergies or changes in medical history, any new surgeries or procedures?    no  If yes, update Summary List   Subjective Functional Status/Changes:  []  No changes reported     Patient reports he was sore in the front of his hips yesterday which he relates to his new exercises. OBJECTIVE  Modalities Rationale:     decrease inflammation and decrease pain to improve patient's ability to perform work activities. min [] Estim, type/location:                                      []  att     []  unatt     []  w/US     []  w/ice    []  w/heat    min []  Mechanical Traction: type/lbs                   []  pro   []  sup   []  int   []  cont    []  before manual    []  after manual    min []  Ultrasound, settings/location:      min []  Iontophoresis w/ dexamethasone, location:                                               []  take home patch       []  in clinic   10 min [x]  Ice     []  Heat    location/position: To L proximal HS in prone    min []  Vasopneumatic Device, press/temp:     min []  Other:    [x] Skin assessment post-treatment (if applicable):    [x]  intact    []  redness- no adverse reaction     []redness  adverse reaction:        26 min Therapeutic Exercise:  [x]  See flow sheet   Rationale:      increase ROM and increase strength to improve the patients ability to perform walking activities.       15 min Manual Therapy: STM to B IP, B RF; L proximal HS   Rationale:      decrease pain, increase ROM, increase tissue extensibility and decrease trigger points to improve patient's ability to perform standing activities. Billed With/As:   [x] TE   [] TA   [] Neuro   [] Self Care Patient Education: [x] Review HEP    [] Progressed/Changed HEP based on:   [] positioning   [] body mechanics   [] transfers   [] heat/ice application    [] other:      Other Objective/Functional Measures: Added quadruped pelvic tilt, bird dog, and back to wall lat stretch all for improved pelvic control and stability  Improved technique and tolerance to modified russian HS curl and med ball reverse crunch exercise     Post Treatment Pain Level (on 0 to 10) scale:   0  / 10     ASSESSMENT  Assessment/Changes in Function:     Patient tolerated session well, noting reduced pain. Educated patient to perform new exercises as part of HEP. []  See Progress Note/Recertification   Patient will continue to benefit from skilled PT services to modify and progress therapeutic interventions, address functional mobility deficits, address ROM deficits, address strength deficits, analyze and address soft tissue restrictions, analyze and cue movement patterns, analyze and modify body mechanics/ergonomics and assess and modify postural abnormalities to attain remaining goals.    Progress toward goals / Updated goals:    Progressing toward LTG#3     PLAN  [x]  Upgrade activities as tolerated yes Continue plan of care   []  Discharge due to :    []  Other:      Therapist: Washington Victor PT    Date: 9/24/2020 Time: 5:07 PM     Future Appointments   Date Time Provider [place]   9/29/2020  8:00 AM Missael Fiore, PT [id number]   10/2/2020  8:00 AM Bridget Counter, PTA [facility]   10/6/2020  3:30 PM Missael Fiore PT [facility]   10/9/2020  3:00 PM Keren Fisher [facility]   10/13/2020  3:30 PM Missael Fiore PT [facility]   10/16/2020  3:00 PM Bridget Counter, PTA [id number]

## 2020-09-29 ENCOUNTER — HOSPITAL ENCOUNTER (OUTPATIENT)
Dept: PHYSICAL THERAPY | Age: 55
Discharge: HOME | End: 2020-09-29
Payer: OTHER GOVERNMENT

## 2020-09-29 PROCEDURE — 97140 MANUAL THERAPY 1/> REGIONS: CPT

## 2020-09-29 PROCEDURE — 97110 THERAPEUTIC EXERCISES: CPT

## 2020-09-29 NOTE — PROGRESS NOTES
PHYSICAL THERAPY - DAILY TREATMENT NOTE    Patient Name: Gilda Leggett        Date: 2020  : 1965   yes Patient  Verified  Visit #:     Insurance: Payor: DALIA / Plan: [address] / Product Type:  /      In time: 950 Out time: 854   Total Treatment Time: 57     Medicare/[employer] Time Tracking (below)   Total Timed Codes (min):  na 1:1 Treatment Time:  na     TREATMENT AREA =  Pain in buttock [M79.18]    SUBJECTIVE  Pain Level (on 0 to 10 scale):  1  / 10   Medication Changes/New allergies or changes in medical history, any new surgeries or procedures?    no  If yes, update Summary List   Subjective Functional Status/Changes:  []  No changes reported     Patient reports he is feeling very good this morning and noticed over the weekend he was able to sit down comfortable and didn't feel like he needed to shift his weight of adjust himself. OBJECTIVE  Modalities Rationale:     decrease inflammation and decrease pain to improve patient's ability to perform transfers.     min [] Estim, type/location:                                      []  att     []  unatt     []  w/US     []  w/ice    []  w/heat    min []  Mechanical Traction: type/lbs                   []  pro   []  sup   []  int   []  cont    []  before manual    []  after manual    min []  Ultrasound, settings/location:      min []  Iontophoresis w/ dexamethasone, location:                                               []  take home patch       []  in clinic   10 min [x]  Ice     []  Heat    location/position: To L proximal HS in prone    min []  Vasopneumatic Device, press/temp:     min []  Other:    [x] Skin assessment post-treatment (if applicable):    [x]  intact    []  redness- no adverse reaction     []redness  adverse reaction:        27 min Therapeutic Exercise:  [x]  See flow sheet   Rationale:      increase ROM, increase strength and improve coordination to improve the patients ability to perform recreational activities. 20 min Manual Therapy: STM to B IP, B TFL, L proximal HS   Rationale:      decrease pain, increase ROM, increase tissue extensibility and decrease trigger points to improve patient's ability to perform seated activities. Billed With/As:   [x] TE   [] TA   [] Neuro   [] Self Care Patient Education: [x] Review HEP    [] Progressed/Changed HEP based on:   [] positioning   [] body mechanics   [] transfers   [] heat/ice application    [] other:      Other Objective/Functional Measures:    Improved core strength during med reverse crunch as evident by technique and overall control of movement  Continues to have increased difficulty with posterior pelvic tilt when in quadruped position     Post Treatment Pain Level (on 0 to 10) scale:   0  / 10     ASSESSMENT  Assessment/Changes in Function:     Patient noted reduced pain post session. Patient is progressing well with PT program at this time with reduction in pain and improved tolerance to functional activities. []  See Progress Note/Recertification   Patient will continue to benefit from skilled PT services to modify and progress therapeutic interventions, address functional mobility deficits, address ROM deficits, address strength deficits, analyze and address soft tissue restrictions, analyze and cue movement patterns, analyze and modify body mechanics/ergonomics and assess and modify postural abnormalities to attain remaining goals.    Progress toward goals / Updated goals:    Progressing with long term pain reduction     PLAN  [x]  Upgrade activities as tolerated yes Continue plan of care   []  Discharge due to :    []  Other:      Therapist: July Og PT    Date: 9/29/2020 Time: 9:16 AM     Future Appointments   Date Time Provider [place]   10/2/2020  8:00 AM Anita [id number]   10/6/2020  3:30 PM Gagandeep Gordillo PT [facility]   10/9/2020  3:00 PM Anita Rosas [facility]   10/13/2020 3:30 PM Marquis Dooley, LUCY [address]   10/16/2020  3:00 PM Angel [address]

## 2020-10-02 ENCOUNTER — HOSPITAL ENCOUNTER (OUTPATIENT)
Dept: PHYSICAL THERAPY | Age: 55
Discharge: HOME | End: 2020-10-02
Payer: OTHER GOVERNMENT

## 2020-10-02 PROCEDURE — 97110 THERAPEUTIC EXERCISES: CPT

## 2020-10-02 PROCEDURE — 97140 MANUAL THERAPY 1/> REGIONS: CPT

## 2020-10-02 NOTE — PROGRESS NOTES
PHYSICAL THERAPY - DAILY TREATMENT NOTE    Patient Name: Mary Chen        Date: 10/2/2020  : 1965   yes Patient  Verified  Visit #:     Insurance: Payor:  / Plan: [address] / Product Type:  /      In time: 359 Out time: 910   Total Treatment Time: 65     Medicare/[employer]BS Time Tracking (below)   Total Timed Codes (min):  55 1:1 Treatment Time:       TREATMENT AREA =  Pain in buttock [M79.18]    SUBJECTIVE  Pain Level (on 0 to 10 scale):  1  / 10   Medication Changes/New allergies or changes in medical history, any new surgeries or procedures?    no  If yes, update Summary List   Subjective Functional Status/Changes:  []  No changes reported     I felt really good after I left here last time. OBJECTIVE  Modalities Rationale:     decrease inflammation and decrease pain to improve patient's ability to perform pain free ADLs. min [] Estim, type/location:                                      []  att     []  unatt     []  w/US     []  w/ice    []  w/heat    min []  Mechanical Traction: type/lbs                   []  pro   []  sup   []  int   []  cont    []  before manual    []  after manual    min []  Ultrasound, settings/location:      min []  Iontophoresis w/ dexamethasone, location:                                               []  take home patch       []  in clinic   10 min [x]  Ice     []  Heat    location/position: Prone, (L) glute.     min []  Vasopneumatic Device, press/temp:     min []  Other:    [x] Skin assessment post-treatment (if applicable):    [x]  intact    []  redness- no adverse reaction     []redness  adverse reaction:        45 min Therapeutic Exercise:  [x]  See flow sheet   Rationale:      increase ROM, increase strength, improve coordination and improve balance to improve the patients ability to perform pain free ADLs. 10 min Manual Therapy: TPR proximal HS @ ischial tuberosity.     Rationale:      decrease pain, increase ROM, increase tissue extensibility and decrease trigger points to improve patient's ability to perform pain free ADLs. Billed With/As:   [x] TE   [] TA   [] Neuro   [] Self Care Patient Education: [x] Review HEP    [] Progressed/Changed HEP based on:   [] positioning   [] body mechanics   [] transfers   [] heat/ice application    [] other:      Other Objective/Functional Measures: Therex per flow sheet. Post Treatment Pain Level (on 0 to 10) scale:   0  / 10     ASSESSMENT  Assessment/Changes in Function:     No exacerbation of symptoms with today's session. []  See Progress Note/Recertification   Patient will continue to benefit from skilled PT services to modify and progress therapeutic interventions, address functional mobility deficits, address ROM deficits, address strength deficits, analyze and address soft tissue restrictions, analyze and cue movement patterns, analyze and modify body mechanics/ergonomics and assess and modify postural abnormalities to attain remaining goals. Progress toward goals / Updated goals:    Steady progress toward pain goals.       PLAN  [x]  Upgrade activities as tolerated yes Continue plan of care   []  Discharge due to :    []  Other:      Therapist: Romina Ram PTA    Date: 10/2/2020 Time: 8:26 AM     Future Appointments   Date Time Provider [place]   10/6/2020  3:30 PM Kaden Bobo, PT [id number]   10/9/2020  3:00 PM Christiano Green [facility]   10/13/2020  3:30 PM Kaden Bobo PT [facility]   10/16/2020  3:00 PM Sonal Pop PTA [place] [address]

## 2020-10-06 ENCOUNTER — APPOINTMENT (OUTPATIENT)
Dept: PHYSICAL THERAPY | Age: 55
End: 2020-10-06
Payer: OTHER GOVERNMENT

## 2020-10-09 ENCOUNTER — HOSPITAL ENCOUNTER (OUTPATIENT)
Dept: PHYSICAL THERAPY | Age: 55
Discharge: HOME | End: 2020-10-09
Payer: OTHER GOVERNMENT

## 2020-10-09 PROCEDURE — 97140 MANUAL THERAPY 1/> REGIONS: CPT

## 2020-10-09 PROCEDURE — 97110 THERAPEUTIC EXERCISES: CPT

## 2020-10-11 NOTE — PROGRESS NOTES
PHYSICAL THERAPY - DAILY TREATMENT NOTE    Patient Name: Gilda Leggett        Date: 10/11/2020  : 1965   yes Patient  Verified  Visit #:   10   of   12  Insurance: Payor: DALIA / Plan: [address] / Product Type:  /      In time: 3 Out time: 410   Total Treatment Time: 70     Medicare/[employer] Time Tracking (below)   Total Timed Codes (min):  60 1:1 Treatment Time:       TREATMENT AREA =  Pain in buttock [M79.18]    SUBJECTIVE  Pain Level (on 0 to 10 scale):  3  / 10   Medication Changes/New allergies or changes in medical history, any new surgeries or procedures?    no  If yes, update Summary List   Subjective Functional Status/Changes:  []  No changes reported     Pt reporting consistent improvement, but still gets some nagging pain at the sit bones. States he is using the massage ball a lot. OBJECTIVE  Modalities Rationale:     decrease inflammation and decrease pain to improve patient's ability to perform pain free ADLs. min [] Estim, type/location:                                      []  att     []  unatt     []  w/US     []  w/ice    []  w/heat    min []  Mechanical Traction: type/lbs                   []  pro   []  sup   []  int   []  cont    []  before manual    []  after manual    min []  Ultrasound, settings/location:      min []  Iontophoresis w/ dexamethasone, location:                                               []  take home patch       []  in clinic   10 min [x]  Ice     []  Heat    location/position: Supine, (L) HS.     min []  Vasopneumatic Device, press/temp:     min []  Other:    [x] Skin assessment post-treatment (if applicable):    [x]  intact    []  redness- no adverse reaction     []redness  adverse reaction:        45 min Therapeutic Exercise:  [x]  See flow sheet   Rationale:      increase ROM, increase strength, improve coordination and improve balance to improve the patients ability to perform pain free ADLs.       15 Min Manual Therapy: STM/DTM and TPR (L) proximal HS. Rationale:      decrease pain, increase ROM, increase tissue extensibility and decrease trigger points to improve patient's ability to perform pain free ADLs. Billed With/As:   [x] TE   [] TA   [] Neuro   [] Self Care Patient Education: [x] Review HEP    [] Progressed/Changed HEP based on:   [] positioning   [] body mechanics   [] transfers   [] heat/ice application    [] other:      Other Objective/Functional Measures: Therex per flow sheet. Post Treatment Pain Level (on 0 to 10) scale:   0   / 10     ASSESSMENT  Assessment/Changes in Function:     No exacerbation of symptoms with today's session. []  See Progress Note/Recertification   Patient will continue to benefit from skilled PT services to modify and progress therapeutic interventions, address functional mobility deficits, address ROM deficits, address strength deficits, analyze and address soft tissue restrictions, analyze and cue movement patterns, analyze and modify body mechanics/ergonomics and assess and modify postural abnormalities to attain remaining goals. Progress toward goals / Updated goals:    No change in progress toward LTG's with today's session.       PLAN  [x]  Upgrade activities as tolerated yes Continue plan of care   []  Discharge due to :    []  Other:      Therapist: Catherine Sosa PTA    Date: 10/11/2020 Time: 3:26 PM     Future Appointments   Date Time Provider [place]   10/13/2020  3:30 PM Patricio Garcia, PT [facility]   10/16/2020  3:00 PM Rudy Koroma [facility]   10/23/2020  9:15 AM Muriel Cook, PT [id number]

## 2020-10-13 ENCOUNTER — HOSPITAL ENCOUNTER (OUTPATIENT)
Dept: PHYSICAL THERAPY | Age: 55
Discharge: HOME | End: 2020-10-13
Payer: OTHER GOVERNMENT

## 2020-10-13 PROCEDURE — 97140 MANUAL THERAPY 1/> REGIONS: CPT

## 2020-10-13 PROCEDURE — 97110 THERAPEUTIC EXERCISES: CPT

## 2020-10-13 NOTE — PROGRESS NOTES
PHYSICAL THERAPY - DAILY TREATMENT NOTE    Patient Name: Gilda Leggett        Date: 10/13/2020  : 1965   yes Patient  Verified  Visit #:   6   of   12(+8)  Insurance: Payor: DALIA / Plan: [address] / Product Type:  /      In time: 332 Out time: 423   Total Treatment Time: 51     Medicare/BCBS Time Tracking (below)   Total Timed Codes (min):  na 1:1 Treatment Time:  na     TREATMENT AREA =  Pain in buttock [M79.18]    SUBJECTIVE  Pain Level (on 0 to 10 scale):  1  / 10   Medication Changes/New allergies or changes in medical history, any new surgeries or procedures?    no  If yes, update Summary List   Subjective Functional Status/Changes:  []  No changes reported     Patient reports his hip continues to slowly improve. Patient has remained compliant with his home exercises and feels like he is getting stronger. OBJECTIVE  Modalities Rationale:     decrease inflammation and decrease pain to improve patient's ability to perform transfers. min [] Estim, type/location:                                      []  att     []  unatt     []  w/US     []  w/ice    []  w/heat    min []  Mechanical Traction: type/lbs                   []  pro   []  sup   []  int   []  cont    []  before manual    []  after manual    min []  Ultrasound, settings/location:      min []  Iontophoresis w/ dexamethasone, location:                                               []  take home patch       []  in clinic   10 min [x]  Ice     []  Heat    location/position: To L proximal hip in prone    min []  Vasopneumatic Device, press/temp:     min []  Other:    [x] Skin assessment post-treatment (if applicable):    [x]  intact    []  redness- no adverse reaction     []redness  adverse reaction:        23 min Therapeutic Exercise:  [x]  See flow sheet   Rationale:      increase ROM and increase strength to improve the patients ability to perform walking activities.       18 min Manual Therapy: STM to R IP, R RF, L proximal HS   Rationale:      decrease pain, increase ROM, increase tissue extensibility and decrease trigger points to improve patient's ability to perform standing activities. Billed With/As:   [x] TE   [] TA   [] Neuro   [] Self Care Patient Education: [x] Review HEP    [] Progressed/Changed HEP based on:   [] positioning   [] body mechanics   [] transfers   [] heat/ice application    [] other:      Other Objective/Functional Measures: Added rear foot elevated split squat and TRX lateral lunges this session for improved strength  Patient demonstrating improved control during Bahamian HS curls indicating improved eccentric strength     Post Treatment Pain Level (on 0 to 10) scale:   0  / 10     ASSESSMENT  Assessment/Changes in Function:     Patient tolerated progression of program well. Patient is demonstrating good progress with strength gains contributing to reduced pain during ADL's.      []  See Progress Note/Recertification   Patient will continue to benefit from skilled PT services to modify and progress therapeutic interventions, address functional mobility deficits, address ROM deficits, address strength deficits, analyze and address soft tissue restrictions, analyze and cue movement patterns, analyze and modify body mechanics/ergonomics and assess and modify postural abnormalities to attain remaining goals.    Progress toward goals / Updated goals:    Progressing with LTG#3     PLAN  [x]  Upgrade activities as tolerated yes Continue plan of care   []  Discharge due to :    []  Other:      Therapist: Jaspal Najera PT    Date: 10/13/2020 Time: 4:21 PM     Future Appointments   Date Time Provider [place]   10/16/2020  3:00 PM Luis Rodney [facility]   10/23/2020  9:15 AM Aye Hickman PT [facility]

## 2020-10-16 ENCOUNTER — HOSPITAL ENCOUNTER (OUTPATIENT)
Dept: PHYSICAL THERAPY | Age: 55
Discharge: HOME | End: 2020-10-16
Payer: OTHER GOVERNMENT

## 2020-10-16 PROCEDURE — 97110 THERAPEUTIC EXERCISES: CPT

## 2020-10-16 PROCEDURE — 97140 MANUAL THERAPY 1/> REGIONS: CPT

## 2020-10-16 NOTE — PROGRESS NOTES
PHYSICAL THERAPY - DAILY TREATMENT NOTE    Patient Name: Jose Gaffney        Date: 10/16/2020  : 1965   yes Patient  Verified  Visit #:   12   of   12 (+8)  Insurance: Payor:  / Plan: [facility] / Product Type:  /      In time: 255 Out time: 405   Total Treatment Time: 70     Medicare/[employer] Time Tracking (below)   Total Timed Codes (min):  60 1:1 Treatment Time:       TREATMENT AREA =  Pain in buttock [M79.18]    SUBJECTIVE  Pain Level (on 0 to 10 scale):  1  / 10   Medication Changes/New allergies or changes in medical history, any new surgeries or procedures?    no  If yes, update Summary List   Subjective Functional Status/Changes:  []  No changes reported     I rode my bike this morning, ~ 6 miles. OBJECTIVE  Modalities Rationale:     decrease inflammation and decrease pain to improve patient's ability to perform pain free ADLs. min [] Estim, type/location:                                      []  att     []  unatt     []  w/US     []  w/ice    []  w/heat    min []  Mechanical Traction: type/lbs                   []  pro   []  sup   []  int   []  cont    []  before manual    []  after manual    min []  Ultrasound, settings/location:      min []  Iontophoresis w/ dexamethasone, location:                                               []  take home patch       []  in clinic   10 min [x]  Ice     []  Heat    location/position: (L) HS.     min []  Vasopneumatic Device, press/temp:     min []  Other:    [x] Skin assessment post-treatment (if applicable):    [x]  intact    []  redness- no adverse reaction     []redness  adverse reaction:        45 min Therapeutic Exercise:  [x]  See flow sheet   Rationale:      increase ROM, increase strength and improve coordination to improve the patients ability to perform pain free ADLs. 15 min Manual Therapy: TPR (L) proximal HS, glute med, (B) psoas.      Rationale:      decrease pain, increase ROM, increase tissue extensibility and decrease trigger points to improve patient's ability to perform pain free ADLs. Billed With/As:   [x] TE   [] TA   [] Neuro   [] Self Care Patient Education: [x] Review HEP    [] Progressed/Changed HEP based on:   [] positioning   [] body mechanics   [] transfers   [] heat/ice application    [] other:      Other Objective/Functional Measures: Therex per flow sheet. Post Treatment Pain Level (on 0 to 10) scale:   0   / 10     ASSESSMENT  Assessment/Changes in Function:     TTP (L) HS.       []  See Progress Note/Recertification   Patient will continue to benefit from skilled PT services to modify and progress therapeutic interventions, address functional mobility deficits, address ROM deficits, address strength deficits, analyze and address soft tissue restrictions, analyze and cue movement patterns, analyze and modify body mechanics/ergonomics and assess and modify postural abnormalities to attain remaining goals. Progress toward goals / Updated goals:    Good progress toward LTG #1.       PLAN  [x]  Upgrade activities as tolerated yes Continue plan of care   []  Discharge due to :    []  Other:      Therapist: Svetlana Gore PTA    Date: 10/16/2020 Time: 3:08 PM     Future Appointments   Date Time Provider [place]   10/23/2020  9:15 AM Dari Ibarra, PT [id number]

## 2020-10-23 ENCOUNTER — HOSPITAL ENCOUNTER (OUTPATIENT)
Dept: PHYSICAL THERAPY | Age: 55
Discharge: HOME | End: 2020-10-23
Payer: OTHER GOVERNMENT

## 2020-10-23 PROCEDURE — 97140 MANUAL THERAPY 1/> REGIONS: CPT

## 2020-10-23 PROCEDURE — 97110 THERAPEUTIC EXERCISES: CPT

## 2020-10-23 NOTE — PROGRESS NOTES
PHYSICAL THERAPY - DAILY TREATMENT NOTE    Patient Name: Aleks Cat        Date: 10/23/2020  : 1965   yes Patient  Verified  Visit #:   15      20  Insurance: Payor:  / Plan: [address] / Product Type:  /      In time: 958 Out time: 1012   Total Treatment Time: 57     Medicare/BCBS Time Tracking (below)   Total Timed Codes (min):  na 1:1 Treatment Time:  na     TREATMENT AREA =  Pain in buttock [M79.18]    SUBJECTIVE  Pain Level (on 0 to 10 scale):  1-2  / 10   Medication Changes/New allergies or changes in medical history, any new surgeries or procedures?    no  If yes, update Summary List   Subjective Functional Status/Changes:  []  No changes reported     Patient reports about a 50% improvement in symptoms since the start of therapy. Patient states he is able to sit for long periods of time without complication which is a great improvement. Patient does still feel the symptoms on a regular basis with his pain at worst as a 3/10. OBJECTIVE  Modalities Rationale:     decrease inflammation and decrease pain to improve patient's ability to perform transfers.     min [] Estim, type/location:                                      []  att     []  unatt     []  w/US     []  w/ice    []  w/heat    min []  Mechanical Traction: type/lbs                   []  pro   []  sup   []  int   []  cont    []  before manual    []  after manual    min []  Ultrasound, settings/location:      min []  Iontophoresis w/ dexamethasone, location:                                               []  take home patch       []  in clinic   10 min [x]  Ice     []  Heat    location/position: To L HS in prone    min []  Vasopneumatic Device, press/temp:     min []  Other:    [x] Skin assessment post-treatment (if applicable):    [x]  intact    []  redness- no adverse reaction     []redness  adverse reaction:        32 min Therapeutic Exercise:  [x]  See flow sheet   Rationale:      increase ROM and increase strength to improve the patients ability to perform recreational activities. 15 min Manual Therapy: STM to L proximal biceps femoris in prone   Rationale:      decrease pain, increase ROM, increase tissue extensibility and decrease trigger points to improve patient's ability to perform walking activities. Billed With/As:   [x] TE   [] TA   [] Neuro   [] Self Care Patient Education: [x] Review HEP    [] Progressed/Changed HEP based on:   [] positioning   [] body mechanics   [] transfers   [] heat/ice application    [] other:      Other Objective/Functional Measures:    See PN     Post Treatment Pain Level (on 0 to 10) scale:   0  / 10     ASSESSMENT  Assessment/Changes in Function:     See PN     []  See Progress Note/Recertification   Patient will continue to benefit from skilled PT services to modify and progress therapeutic interventions, address functional mobility deficits, address ROM deficits, address strength deficits, analyze and address soft tissue restrictions, analyze and cue movement patterns, analyze and modify body mechanics/ergonomics and assess and modify postural abnormalities to attain remaining goals.    Progress toward goals / Updated goals:    See PN     PLAN  [x]  Upgrade activities as tolerated yes Continue plan of care   []  Discharge due to :    []  Other:      Therapist: Nas Brown, PT    Date: 10/23/2020 Time: 10:34 AM     Future Appointments   Date Time Provider [place]   10/30/2020 10:00 AM LUCY Pruitt 3914   11/13/2020  8:30 AM LUCY Pruitt 3914   11/30/2020  8:00 AM LUCY Pruitt 3914

## 2020-10-23 NOTE — PROGRESS NOTES
[facility] THERAPY  [address] [facility], [facility] [address],[facility], [address] [facility] - Phone: [phone]  Fax: [phone]  PROGRESS NOTE  Patient Name: Monique Franklin : 1965   Treatment/Medical Diagnosis: Pain in buttock [M79.18]   Referral Source: Gay Albert,*     Date of Initial Visit: 20 Attended Visits: 13 Missed Visits: 0     SUMMARY OF TREATMENT  Patient has attended 12 follow up visits since his initial evaluation on 20. Patient has received therapeutic exercise, manual therapy and modalities in order to improve L hip ROM, mobility, flexibility, strength, soft tissue extensibility and pain reduction. CURRENT STATUS  Patient reports an approximate 50% improvement in symptoms since the start of therapy. Patient states his sitting tolerance has really improved as he can now sit for long periods of time without increased pain. Patient states he does still feel symptoms on a regular basis, however, and feels he needs to do consistent self myofascial release work and exercises to keep them reduced. Patient rates his pain at worst as a 3/10 over the past 1-2 weeks. Patient does demonstrate good gains in core and LE strength, indicating adherence to HEP. Patient would benefit from continued therapy in order to reduce remaining symptoms and transition toward DC. Goal/Measure of Progress Goal Met? 1. Patient will demonstrate independence with advanced HEP in preparation for DC to HEP   Status at last Eval: n/a Current Status: Progressing with independence progressing   2. Patient will report reduction in pain at worst to 1-2/10 in order to improve tolerance to recreational activities. Status at last Eval: 4/10 Current Status: 3/10 Progressing   3.   Patient will improve L LE strength to 5/5 for all planes in order to improve tolerance to household activities.    Status at last Eval: L LE strength: hip ext 4+/5, abd 5/5, knee flex 4+/5 Current Status: LE strength 5/5 yes     New Goals to be achieved in __4__  weeks:  1. Continue with LTG#1  2. Continue with LTG#2    RECOMMENDATIONS  Patient will benefit from continued PT for 2 additional sessions over the next 3-4 weeks in order to address remaining symptoms and transition toward discharge. If you have any questions/comments please contact us directly at [phone]. Thank you for allowing us to assist in the care of your patient. Therapist Signature: Khoi Wilkerson PT Date: 10/23/2020     Time: 10:50 AM   NOTE TO PHYSICIAN:  PLEASE COMPLETE THE ORDERS BELOW AND FAX TO   [facility]: [phone]  If you are unable to process this request in 24 hours please contact our office: [phone]    ___ I have read the above report and request that my patient continue as recommended.   ___ I have read the above report and request that my patient continue therapy with the following changes/special instructions:_________________________________________________________   ___ I have read the above report and request that my patient be discharged from therapy.      Physician Signature:        Date:       Time:

## 2020-10-30 ENCOUNTER — HOSPITAL ENCOUNTER (OUTPATIENT)
Dept: PHYSICAL THERAPY | Age: 55
Discharge: HOME | End: 2020-10-30
Payer: OTHER GOVERNMENT

## 2020-10-30 PROCEDURE — 97110 THERAPEUTIC EXERCISES: CPT

## 2020-10-30 PROCEDURE — 97140 MANUAL THERAPY 1/> REGIONS: CPT

## 2020-10-30 NOTE — PROGRESS NOTES
PHYSICAL THERAPY - DAILY TREATMENT NOTE    Patient Name: Jose Gaffney        Date: 10/30/2020  : 1965   yes Patient  Verified  Visit #:   15      20  Insurance: Payor: DALIA / Plan: [address] / Product Type:  /      In time: 1000 Out time: 1050   Total Treatment Time: 50     Medicare/BCBS Time Tracking (below)   Total Timed Codes (min):  na 1:1 Treatment Time:  na     TREATMENT AREA =  Pain in buttock [M79.18]    SUBJECTIVE  Pain Level (on 0 to 10 scale):  1-2  / 10   Medication Changes/New allergies or changes in medical history, any new surgeries or procedures?    no  If yes, update Summary List   Subjective Functional Status/Changes:  []  No changes reported     Patient reports he thought he re-aggravated his hamstring during yoga, but is feeling better today. OBJECTIVE    20 min Therapeutic Exercise:  [x]  See flow sheet   Rationale:      increase ROM and increase strength to improve the patients ability to perform walking activities. 30 min Manual Therapy: STM to L proximal HS, L RF, L TFL; posterior L ilium mobs   Rationale:      decrease pain, increase ROM, increase tissue extensibility and decrease trigger points to improve patient's ability to perform recreational activities. The manual therapy interventions were performed at a separate and distinct time from the therapeutic activities interventions. Billed With/As:   [x] TE   [] TA   [] Neuro   [] Self Care Patient Education: [x] Review HEP    [] Progressed/Changed HEP based on:   [] positioning   [] body mechanics   [] transfers   [] heat/ice application    [] other:      Other Objective/Functional Measures:     Added SL thoracic rotation and supine pelvic lifts with ball this session for improved thoracic and pelvic mobility, respectively  Improving soft tissue extensibility to proximal HS noted during MT treatment compared to previous sessions     Post Treatment Pain Level (on 0 to 10) scale: 0  / 10     ASSESSMENT  Assessment/Changes in Function:     Patient tolerated progression well. Educated on appropriate programming during the week to avoid aggravating symptoms. Provided updated copy of HEP and instructed on home use. []  See Progress Note/Recertification   Patient will continue to benefit from skilled PT services to modify and progress therapeutic interventions, address functional mobility deficits, address ROM deficits, address strength deficits, analyze and address soft tissue restrictions, analyze and cue movement patterns, analyze and modify body mechanics/ergonomics and assess and modify postural abnormalities to attain remaining goals.    Progress toward goals / Updated goals:    Progressing with LTG#2     PLAN  [x]  Upgrade activities as tolerated yes Continue plan of care   []  Discharge due to :    []  Other:      Therapist: Merlene Verdin, PT    Date: 10/30/2020 Time: 11:07 AM     Future Appointments   Date Time Provider [place]   11/13/2020  8:30 AM Rocky Babcock,  [address] [facility]   11/30/2020  8:00 AM Rocky Babcock, PT [id number]

## 2020-11-13 ENCOUNTER — HOSPITAL ENCOUNTER (OUTPATIENT)
Dept: PHYSICAL THERAPY | Age: 55
Discharge: HOME | End: 2020-11-13
Payer: OTHER GOVERNMENT

## 2020-11-13 PROCEDURE — 97140 MANUAL THERAPY 1/> REGIONS: CPT

## 2020-11-13 NOTE — PROGRESS NOTES
PHYSICAL THERAPY - DAILY TREATMENT NOTE    Patient Name: Gilda Leggett        Date: 2020  : 1965   yes Patient  Verified  Visit #:   15   of   20  Insurance: Payor: DALIA / Plan: [address] / Product Type:  /      In time: 1 Out time: 032   Total Treatment Time: 34     Medicare/[facility] Time Tracking (below)   Total Timed Codes (min):  na 1:1 Treatment Time:  na     TREATMENT AREA =  Pain in buttock [M79.18]    SUBJECTIVE  Pain Level (on 0 to 10 scale):  0  / 10   Medication Changes/New allergies or changes in medical history, any new surgeries or procedures?    no  If yes, update Summary List   Subjective Functional Status/Changes:  []  No changes reported     Patient reports he has remained consistent with his exercise program as well as a yoga regimen which has helped manage his symptoms. Patient states he notices some tightness after heavy activities, but he is able to reduce his symptoms afterwards. OBJECTIVE    34 min Manual Therapy: STM to L proximal HS, B IP and RF   Rationale:      decrease pain, increase ROM, increase tissue extensibility and decrease trigger points to improve patient's ability to perform recreational activities. The manual therapy interventions were performed at a separate and distinct time from the therapeutic activities interventions. Billed With/As:   [] TE   [] TA   [] Neuro   [] Self Care Patient Education: [x] Review HEP    [] Progressed/Changed HEP based on:   [] positioning   [] body mechanics   [] transfers   [] heat/ice application    [] other:      Other Objective/Functional Measures:    Held on therapeutic exercise program this session as patient will complete independently. Increased soft tissue tone noted to L proximal HS during MT treatment     Post Treatment Pain Level (on 0 to 10) scale:   0  / 10     ASSESSMENT  Assessment/Changes in Function:     Patient denied changes in symptoms post session.  Patient appropriate to transition toward DC at [address] as he will assess tolerance to independence with program over next 2 weeks. []  See Progress Note/Recertification   Patient will continue to benefit from skilled PT services to modify and progress therapeutic interventions, address functional mobility deficits, address ROM deficits, address strength deficits, analyze and address soft tissue restrictions, analyze and cue movement patterns, analyze and modify body mechanics/ergonomics and assess and modify postural abnormalities to attain remaining goals.    Progress toward goals / Updated goals:    Plan to DC to [facility] at [address]  [x]  Upgrade activities as tolerated yes Continue plan of care   []  Discharge due to :    []  Other:      Therapist: July Og, PT    Date: 11/13/2020 Time: 9:12 AM     Future Appointments   Date Time Provider [place]   11/30/2020  8:00 AM Gagandeep Gordillo, PT [id number]

## 2020-11-30 ENCOUNTER — HOSPITAL ENCOUNTER (OUTPATIENT)
Dept: PHYSICAL THERAPY | Age: 55
Discharge: HOME | End: 2020-11-30
Payer: OTHER GOVERNMENT

## 2020-11-30 PROCEDURE — 97535 SELF CARE MNGMENT TRAINING: CPT

## 2020-11-30 PROCEDURE — 97140 MANUAL THERAPY 1/> REGIONS: CPT

## 2020-11-30 NOTE — PROGRESS NOTES
[address] PHYSICAL THERAPY   [facility], [address] [facility], [address] [place] [address] - Phone: [phone]  Fax: [phone] SUMMARY  Patient Name: Lizabeth Harding : 1965   Treatment/Medical Diagnosis: Pain in buttock [M79.18]   Referral Source: [facility],*     Date of Initial Visit: 20 Attended Visits: 16 Missed Visits: 0     SUMMARY OF TREATMENT  Patient has attended 15 follow up visits since his initial evaluation on 20. Patient has received therapeutic exercise, manual therapy and modalities in order to improve L hip ROM, mobility, flexibility, strength, soft tissue extensibility and pain reduction. CURRENT STATUS  Patient has progressed very well with his PT program to date, noting reduced pain intensity and improved tolerance to high level activities such as running and cutting without residual symptoms. While patient may still experience symptoms during the week, he is able to self manage with performance of his exercises as well as soft tissue work and gets \"several days\" of relief. Patient notes a significant improvement in his FOTO score to 95/100 indicating improved ADL function. At this time, patient will be discharged from PT. Goal/Measure of Progress Goal Met? 1. Patient will demonstrate independence with advanced HEP in preparation for DC to HEP   Status at last Eval: Progressing with independence Current Status: Independent with program yes   2. Patient will report reduction in pain at worst to 1-2/10 in order to improve tolerance to recreational activities. Status at last Eval: 3/10 Current Status: 1-2/10 yes   RECOMMENDATIONS  Discontinue therapy. Progressing towards or have reached established goals. If you have any questions/comments please contact us directly at [phone]. Thank you for allowing us to assist in the care of your patient. Therapist Signature:  Francoise Marks, PT Date: 11/30/20     Time: 11:03 AM

## 2020-11-30 NOTE — PROGRESS NOTES
PHYSICAL THERAPY - DAILY TREATMENT NOTE    Patient Name: Britton Patel        Date: 2020  : 1965   yes Patient  Verified  Visit #:      20  Insurance: Payor: DALIA / Plan: Joleen Pierre / Product Type:  /      In time: 894 Out time: 835   Total Treatment Time: 36     Medicare/[facility] Time Tracking (below)   Total Timed Codes (min):  na 1:1 Treatment Time:  na     TREATMENT AREA =  Pain in buttock [M79.18]    SUBJECTIVE  Pain Level (on 0 to 10 scale):  0  / 10   Medication Changes/New allergies or changes in medical history, any new surgeries or procedures?    no  If yes, update Summary List   Subjective Functional Status/Changes:  []  No changes reported     Patient reports a significant improvement in symptoms since the start of PT. Patient reports consistent performance of his home exercises which helps control and manage his symptoms. Patient does feel like the discomfort is still there, however the intensity and frequency has reduced as well as tolerance to functional activities. OBJECTIVE      25 min Manual Therapy: STM to L proximal HS, B IP and RF   Rationale:      decrease pain, increase ROM, increase tissue extensibility and decrease trigger points to improve patient's ability to perform work activities. The manual therapy interventions were performed at a separate and distinct time from the therapeutic activities interventions. 11 min Self Care: Discussed progress to date, performance of home exercises and gradual progression   Rationale:    increase ROM to improve the patients ability to perform recreational activities.      Billed With/As:   [] TE   [] TA   [] Neuro   [] Self Care Patient Education: [x] Review HEP    [] Progressed/Changed HEP based on:   [] positioning   [] body mechanics   [] transfers   [] heat/ice application    [] other:      Other Objective/Functional Measures:    See DC note     Post Treatment Pain Level (on 0 to 10) scale: 0  / 10     ASSESSMENT  Assessment/Changes in Function:     See DC note           Progress toward goals / Updated goals:    See DC note     PLAN  []  Upgrade activities as tolerated no Continue plan of care   [x]  Discharge due to : Met or progressing toward all goals   []  Other:      Therapist: David Brown PT    Date: 11/30/2020 Time: 10:54 AM     No future appointments.

## 2023-01-26 ENCOUNTER — NEW PATIENT (OUTPATIENT)
Dept: URBAN - METROPOLITAN AREA CLINIC 1 | Facility: CLINIC | Age: 58
End: 2023-01-26

## 2023-01-26 DIAGNOSIS — H25.813: ICD-10-CM

## 2023-01-26 DIAGNOSIS — H11.153: ICD-10-CM

## 2023-01-26 DIAGNOSIS — H01.024: ICD-10-CM

## 2023-01-26 DIAGNOSIS — H01.021: ICD-10-CM

## 2023-01-26 PROCEDURE — 92002 INTRM OPH EXAM NEW PATIENT: CPT

## 2023-01-26 ASSESSMENT — VISUAL ACUITY
OD_CC: 20/20
OS_CC: J2
OD_CC: J1+
OS_CC: 20/20-2

## 2023-01-26 ASSESSMENT — TONOMETRY
OD_IOP_MMHG: 16
OS_IOP_MMHG: 16